# Patient Record
Sex: FEMALE | Race: WHITE | NOT HISPANIC OR LATINO | URBAN - METROPOLITAN AREA
[De-identification: names, ages, dates, MRNs, and addresses within clinical notes are randomized per-mention and may not be internally consistent; named-entity substitution may affect disease eponyms.]

---

## 2023-03-29 ENCOUNTER — INPATIENT (INPATIENT)
Facility: HOSPITAL | Age: 88
LOS: 2 days | Discharge: HOME | DRG: 603 | End: 2023-04-01
Attending: INTERNAL MEDICINE | Admitting: INTERNAL MEDICINE
Payer: MEDICARE

## 2023-03-29 ENCOUNTER — TRANSCRIPTION ENCOUNTER (OUTPATIENT)
Age: 88
End: 2023-03-29

## 2023-03-29 VITALS
TEMPERATURE: 99 F | HEIGHT: 59 IN | HEART RATE: 82 BPM | DIASTOLIC BLOOD PRESSURE: 84 MMHG | OXYGEN SATURATION: 95 % | SYSTOLIC BLOOD PRESSURE: 143 MMHG | WEIGHT: 130.07 LBS | RESPIRATION RATE: 16 BRPM

## 2023-03-29 DIAGNOSIS — Z90.49 ACQUIRED ABSENCE OF OTHER SPECIFIED PARTS OF DIGESTIVE TRACT: Chronic | ICD-10-CM

## 2023-03-29 LAB
ALBUMIN SERPL ELPH-MCNC: 3.1 G/DL — LOW (ref 3.3–5)
ALP SERPL-CCNC: 86 U/L — SIGNIFICANT CHANGE UP (ref 40–120)
ALT FLD-CCNC: 19 U/L — SIGNIFICANT CHANGE UP (ref 12–78)
ANION GAP SERPL CALC-SCNC: 5 MMOL/L — SIGNIFICANT CHANGE UP (ref 5–17)
AST SERPL-CCNC: 18 U/L — SIGNIFICANT CHANGE UP (ref 15–37)
BASOPHILS # BLD AUTO: 0.04 K/UL — SIGNIFICANT CHANGE UP (ref 0–0.2)
BASOPHILS NFR BLD AUTO: 0.4 % — SIGNIFICANT CHANGE UP (ref 0–2)
BILIRUB SERPL-MCNC: 0.4 MG/DL — SIGNIFICANT CHANGE UP (ref 0.2–1.2)
BUN SERPL-MCNC: 10 MG/DL — SIGNIFICANT CHANGE UP (ref 7–23)
CALCIUM SERPL-MCNC: 10 MG/DL — SIGNIFICANT CHANGE UP (ref 8.5–10.1)
CHLORIDE SERPL-SCNC: 103 MMOL/L — SIGNIFICANT CHANGE UP (ref 96–108)
CO2 SERPL-SCNC: 27 MMOL/L — SIGNIFICANT CHANGE UP (ref 22–31)
CREAT SERPL-MCNC: 0.65 MG/DL — SIGNIFICANT CHANGE UP (ref 0.5–1.3)
EGFR: 78 ML/MIN/1.73M2 — SIGNIFICANT CHANGE UP
EOSINOPHIL # BLD AUTO: 0.11 K/UL — SIGNIFICANT CHANGE UP (ref 0–0.5)
EOSINOPHIL NFR BLD AUTO: 1.1 % — SIGNIFICANT CHANGE UP (ref 0–6)
GLUCOSE SERPL-MCNC: 152 MG/DL — HIGH (ref 70–99)
HCT VFR BLD CALC: 42.4 % — SIGNIFICANT CHANGE UP (ref 34.5–45)
HGB BLD-MCNC: 13.6 G/DL — SIGNIFICANT CHANGE UP (ref 11.5–15.5)
IMM GRANULOCYTES NFR BLD AUTO: 0.5 % — SIGNIFICANT CHANGE UP (ref 0–0.9)
LACTATE SERPL-SCNC: 1.2 MMOL/L — SIGNIFICANT CHANGE UP (ref 0.7–2)
LYMPHOCYTES # BLD AUTO: 1.92 K/UL — SIGNIFICANT CHANGE UP (ref 1–3.3)
LYMPHOCYTES # BLD AUTO: 19.8 % — SIGNIFICANT CHANGE UP (ref 13–44)
MCHC RBC-ENTMCNC: 28.7 PG — SIGNIFICANT CHANGE UP (ref 27–34)
MCHC RBC-ENTMCNC: 32.1 GM/DL — SIGNIFICANT CHANGE UP (ref 32–36)
MCV RBC AUTO: 89.5 FL — SIGNIFICANT CHANGE UP (ref 80–100)
MONOCYTES # BLD AUTO: 1.03 K/UL — HIGH (ref 0–0.9)
MONOCYTES NFR BLD AUTO: 10.6 % — SIGNIFICANT CHANGE UP (ref 2–14)
NEUTROPHILS # BLD AUTO: 6.53 K/UL — SIGNIFICANT CHANGE UP (ref 1.8–7.4)
NEUTROPHILS NFR BLD AUTO: 67.6 % — SIGNIFICANT CHANGE UP (ref 43–77)
NRBC # BLD: 0 /100 WBCS — SIGNIFICANT CHANGE UP (ref 0–0)
PLATELET # BLD AUTO: 263 K/UL — SIGNIFICANT CHANGE UP (ref 150–400)
POTASSIUM SERPL-MCNC: 3.7 MMOL/L — SIGNIFICANT CHANGE UP (ref 3.5–5.3)
POTASSIUM SERPL-SCNC: 3.7 MMOL/L — SIGNIFICANT CHANGE UP (ref 3.5–5.3)
PROT SERPL-MCNC: 7.6 G/DL — SIGNIFICANT CHANGE UP (ref 6–8.3)
RBC # BLD: 4.74 M/UL — SIGNIFICANT CHANGE UP (ref 3.8–5.2)
RBC # FLD: 12.4 % — SIGNIFICANT CHANGE UP (ref 10.3–14.5)
SARS-COV-2 RNA SPEC QL NAA+PROBE: SIGNIFICANT CHANGE UP
SODIUM SERPL-SCNC: 135 MMOL/L — SIGNIFICANT CHANGE UP (ref 135–145)
WBC # BLD: 9.68 K/UL — SIGNIFICANT CHANGE UP (ref 3.8–10.5)
WBC # FLD AUTO: 9.68 K/UL — SIGNIFICANT CHANGE UP (ref 3.8–10.5)

## 2023-03-29 PROCEDURE — 99285 EMERGENCY DEPT VISIT HI MDM: CPT | Mod: FS

## 2023-03-29 NOTE — ED ADULT TRIAGE NOTE - CHIEF COMPLAINT QUOTE
had   a dental infection on left side, was taking antibiotics, redness, swelling spread to the throat

## 2023-03-30 DIAGNOSIS — Z95.0 PRESENCE OF CARDIAC PACEMAKER: ICD-10-CM

## 2023-03-30 DIAGNOSIS — L03.90 CELLULITIS, UNSPECIFIED: ICD-10-CM

## 2023-03-30 DIAGNOSIS — I10 ESSENTIAL (PRIMARY) HYPERTENSION: ICD-10-CM

## 2023-03-30 DIAGNOSIS — L03.211 CELLULITIS OF FACE: ICD-10-CM

## 2023-03-30 LAB
ANION GAP SERPL CALC-SCNC: 6 MMOL/L — SIGNIFICANT CHANGE UP (ref 5–17)
BUN SERPL-MCNC: 9 MG/DL — SIGNIFICANT CHANGE UP (ref 7–23)
CALCIUM SERPL-MCNC: 9.8 MG/DL — SIGNIFICANT CHANGE UP (ref 8.5–10.1)
CHLORIDE SERPL-SCNC: 105 MMOL/L — SIGNIFICANT CHANGE UP (ref 96–108)
CO2 SERPL-SCNC: 27 MMOL/L — SIGNIFICANT CHANGE UP (ref 22–31)
CREAT SERPL-MCNC: 0.58 MG/DL — SIGNIFICANT CHANGE UP (ref 0.5–1.3)
EGFR: 80 ML/MIN/1.73M2 — SIGNIFICANT CHANGE UP
GLUCOSE SERPL-MCNC: 147 MG/DL — HIGH (ref 70–99)
HCT VFR BLD CALC: 40.7 % — SIGNIFICANT CHANGE UP (ref 34.5–45)
HGB BLD-MCNC: 13.3 G/DL — SIGNIFICANT CHANGE UP (ref 11.5–15.5)
MCHC RBC-ENTMCNC: 29.1 PG — SIGNIFICANT CHANGE UP (ref 27–34)
MCHC RBC-ENTMCNC: 32.7 GM/DL — SIGNIFICANT CHANGE UP (ref 32–36)
MCV RBC AUTO: 89.1 FL — SIGNIFICANT CHANGE UP (ref 80–100)
NRBC # BLD: 0 /100 WBCS — SIGNIFICANT CHANGE UP (ref 0–0)
PLATELET # BLD AUTO: 251 K/UL — SIGNIFICANT CHANGE UP (ref 150–400)
POTASSIUM SERPL-MCNC: 3.4 MMOL/L — LOW (ref 3.5–5.3)
POTASSIUM SERPL-SCNC: 3.4 MMOL/L — LOW (ref 3.5–5.3)
RBC # BLD: 4.57 M/UL — SIGNIFICANT CHANGE UP (ref 3.8–5.2)
RBC # FLD: 12.7 % — SIGNIFICANT CHANGE UP (ref 10.3–14.5)
SODIUM SERPL-SCNC: 138 MMOL/L — SIGNIFICANT CHANGE UP (ref 135–145)
WBC # BLD: 9.52 K/UL — SIGNIFICANT CHANGE UP (ref 3.8–10.5)
WBC # FLD AUTO: 9.52 K/UL — SIGNIFICANT CHANGE UP (ref 3.8–10.5)

## 2023-03-30 PROCEDURE — 70487 CT MAXILLOFACIAL W/DYE: CPT | Mod: 26

## 2023-03-30 RX ORDER — DILTIAZEM HCL 120 MG
180 CAPSULE, EXT RELEASE 24 HR ORAL DAILY
Refills: 0 | Status: DISCONTINUED | OUTPATIENT
Start: 2023-03-30 | End: 2023-04-01

## 2023-03-30 RX ORDER — DIGOXIN 250 MCG
125 TABLET ORAL DAILY
Refills: 0 | Status: DISCONTINUED | OUTPATIENT
Start: 2023-03-30 | End: 2023-04-01

## 2023-03-30 RX ORDER — AMPICILLIN SODIUM AND SULBACTAM SODIUM 250; 125 MG/ML; MG/ML
1.5 INJECTION, POWDER, FOR SUSPENSION INTRAMUSCULAR; INTRAVENOUS EVERY 6 HOURS
Refills: 0 | Status: DISCONTINUED | OUTPATIENT
Start: 2023-03-30 | End: 2023-04-01

## 2023-03-30 RX ORDER — HEPARIN SODIUM 5000 [USP'U]/ML
5000 INJECTION INTRAVENOUS; SUBCUTANEOUS EVERY 12 HOURS
Refills: 0 | Status: DISCONTINUED | OUTPATIENT
Start: 2023-03-30 | End: 2023-04-01

## 2023-03-30 RX ORDER — FUROSEMIDE 40 MG
20 TABLET ORAL DAILY
Refills: 0 | Status: DISCONTINUED | OUTPATIENT
Start: 2023-03-30 | End: 2023-04-01

## 2023-03-30 RX ORDER — POTASSIUM CHLORIDE 20 MEQ
20 PACKET (EA) ORAL ONCE
Refills: 0 | Status: COMPLETED | OUTPATIENT
Start: 2023-03-30 | End: 2023-03-30

## 2023-03-30 RX ADMIN — Medication 20 MILLIGRAM(S): at 11:27

## 2023-03-30 RX ADMIN — Medication 125 MICROGRAM(S): at 11:56

## 2023-03-30 RX ADMIN — Medication 180 MILLIGRAM(S): at 11:27

## 2023-03-30 RX ADMIN — AMPICILLIN SODIUM AND SULBACTAM SODIUM 100 GRAM(S): 250; 125 INJECTION, POWDER, FOR SUSPENSION INTRAMUSCULAR; INTRAVENOUS at 23:03

## 2023-03-30 RX ADMIN — AMPICILLIN SODIUM AND SULBACTAM SODIUM 100 GRAM(S): 250; 125 INJECTION, POWDER, FOR SUSPENSION INTRAMUSCULAR; INTRAVENOUS at 11:27

## 2023-03-30 RX ADMIN — AMPICILLIN SODIUM AND SULBACTAM SODIUM 100 GRAM(S): 250; 125 INJECTION, POWDER, FOR SUSPENSION INTRAMUSCULAR; INTRAVENOUS at 06:27

## 2023-03-30 RX ADMIN — HEPARIN SODIUM 5000 UNIT(S): 5000 INJECTION INTRAVENOUS; SUBCUTANEOUS at 17:02

## 2023-03-30 RX ADMIN — Medication 20 MILLIEQUIVALENT(S): at 17:03

## 2023-03-30 RX ADMIN — AMPICILLIN SODIUM AND SULBACTAM SODIUM 100 GRAM(S): 250; 125 INJECTION, POWDER, FOR SUSPENSION INTRAMUSCULAR; INTRAVENOUS at 17:02

## 2023-03-30 NOTE — H&P ADULT - NSICDXPASTMEDICALHX_GEN_ALL_CORE_FT
PAST MEDICAL HISTORY:  Colon cancer s/p resection 04    [place] (hard of hearing) right ear hearing aid    Hypertension     Pacemaker     Vertigo

## 2023-03-30 NOTE — H&P ADULT - HISTORY OF PRESENT ILLNESS
pt presents to er with facial redness and infection pt presents to er with facial redness and infection, admitting for extension of facial cellulitis to neck area

## 2023-03-30 NOTE — PHYSICAL THERAPY INITIAL EVALUATION ADULT - ADDITIONAL COMMENTS
Pt lives in an apt, no steps. Pt has 24 hr aide and was an independent household ambulator with RW and required assistance for ADLs. Pt has w/c for longer distances.

## 2023-03-30 NOTE — PHYSICAL THERAPY INITIAL EVALUATION ADULT - PERTINENT HX OF CURRENT PROBLEM, REHAB EVAL
89 y/o female adm 3/30 with facial redness and infection, admitted for extension of facial cellulitis to neck area

## 2023-03-30 NOTE — CARE COORDINATION ASSESSMENT. - NSPASTMEDSURGHISTORY_GEN_ALL_CORE_FT
PAST MEDICAL & SURGICAL HISTORY:  Vertigo      Pacemaker      Hypertension      [place] (hard of hearing)  right ear hearing aid      Colon cancer  s/p resection 04      History of colon resection  2004

## 2023-03-30 NOTE — CARE COORDINATION ASSESSMENT. - OTHER PERTINENT DISCHARGE PLANNING INFORMATION:
pt is a 89 year old female, oriented to self, admitted from home due to cellulitis of the neck and chin x2 days. sw spoke with pts niece winter, sw role explained, receptive to sw involvement. pta pt lived at home, garden apt, no stairs with 24 hours aides thru [facility] [employer] in [place]. pt ambulated around her studio apt with walker and assist of aide. wheelchair used for do apts. pts niece involved and supportive, expressed plans for pt to return home when stable.

## 2023-03-30 NOTE — CARE COORDINATION ASSESSMENT. - NSCAREPROVIDERS_GEN_ALL_CORE_FT
CARE PROVIDERS:  Administration: Randolph Collier  Administration: Camila Harris  Admitting: Penelope Gurrola  Attending: Penelope Gurrola  Case Management: Jodi Lares Team: Eb Barney ED Attending: Bhumi Watson ED Nurse: Marilyn Carmona  Nurse: Marilyn Carmona  Nurse: Cynthia Dumont  Nurse: Alicia Gutierrez  Nurse: Florentin Strauss  Nurse: Roddy Bridges  Nurse: Ya Sullivan  Ordered: ADM, User  Ordered: Tanja Mata  Override: Fernanda Barnett  PCA/Nursing Assistant: Fernanda Barnett  Primary Team: Rona Suarez  Primary Team: Penelope Gurrola  Primary Team: Kathy Taylor  Registered Dietitian: Aysha Belcher  Respiratory Therapy: Sofia Thomas  : Cece Cummings

## 2023-03-31 LAB
ANION GAP SERPL CALC-SCNC: 1 MMOL/L — LOW (ref 5–17)
BUN SERPL-MCNC: 10 MG/DL — SIGNIFICANT CHANGE UP (ref 7–23)
CALCIUM SERPL-MCNC: 9.4 MG/DL — SIGNIFICANT CHANGE UP (ref 8.5–10.1)
CHLORIDE SERPL-SCNC: 107 MMOL/L — SIGNIFICANT CHANGE UP (ref 96–108)
CO2 SERPL-SCNC: 32 MMOL/L — HIGH (ref 22–31)
CREAT SERPL-MCNC: 0.55 MG/DL — SIGNIFICANT CHANGE UP (ref 0.5–1.3)
EGFR: 81 ML/MIN/1.73M2 — SIGNIFICANT CHANGE UP
GLUCOSE SERPL-MCNC: 113 MG/DL — HIGH (ref 70–99)
HCT VFR BLD CALC: 39.8 % — SIGNIFICANT CHANGE UP (ref 34.5–45)
HGB BLD-MCNC: 13.1 G/DL — SIGNIFICANT CHANGE UP (ref 11.5–15.5)
MCHC RBC-ENTMCNC: 30 PG — SIGNIFICANT CHANGE UP (ref 27–34)
MCHC RBC-ENTMCNC: 32.9 GM/DL — SIGNIFICANT CHANGE UP (ref 32–36)
MCV RBC AUTO: 91.1 FL — SIGNIFICANT CHANGE UP (ref 80–100)
NRBC # BLD: 0 /100 WBCS — SIGNIFICANT CHANGE UP (ref 0–0)
PLATELET # BLD AUTO: 248 K/UL — SIGNIFICANT CHANGE UP (ref 150–400)
POTASSIUM SERPL-MCNC: 4 MMOL/L — SIGNIFICANT CHANGE UP (ref 3.5–5.3)
POTASSIUM SERPL-SCNC: 4 MMOL/L — SIGNIFICANT CHANGE UP (ref 3.5–5.3)
RBC # BLD: 4.37 M/UL — SIGNIFICANT CHANGE UP (ref 3.8–5.2)
RBC # FLD: 12.5 % — SIGNIFICANT CHANGE UP (ref 10.3–14.5)
SODIUM SERPL-SCNC: 140 MMOL/L — SIGNIFICANT CHANGE UP (ref 135–145)
WBC # BLD: 7.75 K/UL — SIGNIFICANT CHANGE UP (ref 3.8–10.5)
WBC # FLD AUTO: 7.75 K/UL — SIGNIFICANT CHANGE UP (ref 3.8–10.5)

## 2023-03-31 RX ADMIN — AMPICILLIN SODIUM AND SULBACTAM SODIUM 100 GRAM(S): 250; 125 INJECTION, POWDER, FOR SUSPENSION INTRAMUSCULAR; INTRAVENOUS at 05:57

## 2023-03-31 RX ADMIN — AMPICILLIN SODIUM AND SULBACTAM SODIUM 100 GRAM(S): 250; 125 INJECTION, POWDER, FOR SUSPENSION INTRAMUSCULAR; INTRAVENOUS at 11:33

## 2023-03-31 RX ADMIN — HEPARIN SODIUM 5000 UNIT(S): 5000 INJECTION INTRAVENOUS; SUBCUTANEOUS at 18:19

## 2023-03-31 RX ADMIN — Medication 20 MILLIGRAM(S): at 08:37

## 2023-03-31 RX ADMIN — Medication 125 MICROGRAM(S): at 08:36

## 2023-03-31 RX ADMIN — HEPARIN SODIUM 5000 UNIT(S): 5000 INJECTION INTRAVENOUS; SUBCUTANEOUS at 06:01

## 2023-03-31 RX ADMIN — AMPICILLIN SODIUM AND SULBACTAM SODIUM 100 GRAM(S): 250; 125 INJECTION, POWDER, FOR SUSPENSION INTRAMUSCULAR; INTRAVENOUS at 18:19

## 2023-04-01 ENCOUNTER — TRANSCRIPTION ENCOUNTER (OUTPATIENT)
Age: 88
End: 2023-04-01

## 2023-04-01 VITALS
SYSTOLIC BLOOD PRESSURE: 117 MMHG | HEART RATE: 85 BPM | TEMPERATURE: 98 F | RESPIRATION RATE: 18 BRPM | OXYGEN SATURATION: 94 % | DIASTOLIC BLOOD PRESSURE: 69 MMHG

## 2023-04-01 LAB
ANION GAP SERPL CALC-SCNC: 9 MMOL/L — SIGNIFICANT CHANGE UP (ref 5–17)
BUN SERPL-MCNC: 13 MG/DL — SIGNIFICANT CHANGE UP (ref 7–23)
CALCIUM SERPL-MCNC: 10.1 MG/DL — SIGNIFICANT CHANGE UP (ref 8.5–10.1)
CHLORIDE SERPL-SCNC: 103 MMOL/L — SIGNIFICANT CHANGE UP (ref 96–108)
CO2 SERPL-SCNC: 26 MMOL/L — SIGNIFICANT CHANGE UP (ref 22–31)
CREAT SERPL-MCNC: 0.82 MG/DL — SIGNIFICANT CHANGE UP (ref 0.5–1.3)
EGFR: 63 ML/MIN/1.73M2 — SIGNIFICANT CHANGE UP
GLUCOSE SERPL-MCNC: 126 MG/DL — HIGH (ref 70–99)
HCT VFR BLD CALC: 44.9 % — SIGNIFICANT CHANGE UP (ref 34.5–45)
HGB BLD-MCNC: 14.8 G/DL — SIGNIFICANT CHANGE UP (ref 11.5–15.5)
MCHC RBC-ENTMCNC: 29.6 PG — SIGNIFICANT CHANGE UP (ref 27–34)
MCHC RBC-ENTMCNC: 33 GM/DL — SIGNIFICANT CHANGE UP (ref 32–36)
MCV RBC AUTO: 89.8 FL — SIGNIFICANT CHANGE UP (ref 80–100)
NRBC # BLD: 0 /100 WBCS — SIGNIFICANT CHANGE UP (ref 0–0)
PLATELET # BLD AUTO: 308 K/UL — SIGNIFICANT CHANGE UP (ref 150–400)
POTASSIUM SERPL-MCNC: 3.9 MMOL/L — SIGNIFICANT CHANGE UP (ref 3.5–5.3)
POTASSIUM SERPL-SCNC: 3.9 MMOL/L — SIGNIFICANT CHANGE UP (ref 3.5–5.3)
RBC # BLD: 5 M/UL — SIGNIFICANT CHANGE UP (ref 3.8–5.2)
RBC # FLD: 12.3 % — SIGNIFICANT CHANGE UP (ref 10.3–14.5)
SODIUM SERPL-SCNC: 138 MMOL/L — SIGNIFICANT CHANGE UP (ref 135–145)
WBC # BLD: 12.26 K/UL — HIGH (ref 3.8–10.5)
WBC # FLD AUTO: 12.26 K/UL — HIGH (ref 3.8–10.5)

## 2023-04-01 RX ORDER — SIMETHICONE 80 MG/1
80 TABLET, CHEWABLE ORAL ONCE
Refills: 0 | Status: COMPLETED | OUTPATIENT
Start: 2023-04-01 | End: 2023-04-01

## 2023-04-01 RX ADMIN — AMPICILLIN SODIUM AND SULBACTAM SODIUM 100 GRAM(S): 250; 125 INJECTION, POWDER, FOR SUSPENSION INTRAMUSCULAR; INTRAVENOUS at 12:17

## 2023-04-01 RX ADMIN — AMPICILLIN SODIUM AND SULBACTAM SODIUM 100 GRAM(S): 250; 125 INJECTION, POWDER, FOR SUSPENSION INTRAMUSCULAR; INTRAVENOUS at 00:02

## 2023-04-01 RX ADMIN — AMPICILLIN SODIUM AND SULBACTAM SODIUM 100 GRAM(S): 250; 125 INJECTION, POWDER, FOR SUSPENSION INTRAMUSCULAR; INTRAVENOUS at 07:06

## 2023-04-01 RX ADMIN — SIMETHICONE 80 MILLIGRAM(S): 80 TABLET, CHEWABLE ORAL at 14:55

## 2023-04-01 RX ADMIN — Medication 20 MILLIGRAM(S): at 05:57

## 2023-04-01 RX ADMIN — Medication 180 MILLIGRAM(S): at 05:57

## 2023-04-01 RX ADMIN — HEPARIN SODIUM 5000 UNIT(S): 5000 INJECTION INTRAVENOUS; SUBCUTANEOUS at 05:57

## 2023-04-01 RX ADMIN — Medication 125 MICROGRAM(S): at 05:58

## 2023-04-01 NOTE — PROGRESS NOTE ADULT - SUBJECTIVE AND OBJECTIVE BOX
Patient is a 89y old  Female who presents with a chief complaint of face infection (01 Apr 2023 07:46)      INTERVAL HPI/OVERNIGHT EVENTS: Patient seen and examined. NAD. No complaints.    Vital Signs Last 24 Hrs  T(C): 36.6 (01 Apr 2023 11:48), Max: 37 (01 Apr 2023 04:51)  T(F): 97.9 (01 Apr 2023 11:48), Max: 98.6 (01 Apr 2023 04:51)  HR: 85 (01 Apr 2023 11:48) (77 - 103)  BP: 117/69 (01 Apr 2023 11:48) (117/69 - 176/89)  BP(mean): --  RR: 18 (01 Apr 2023 11:48) (18 - 18)  SpO2: 94% (01 Apr 2023 11:48) (92% - 94%)    Parameters below as of 01 Apr 2023 11:48  Patient On (Oxygen Delivery Method): room air        04-01    138  |  103  |  13  ----------------------------<  126<H>  3.9   |  26  |  0.82    Ca    10.1      01 Apr 2023 07:15                            14.8   12.26 )-----------( 308      ( 01 Apr 2023 07:15 )             44.9       CAPILLARY BLOOD GLUCOSE                  ampicillin/sulbactam  IVPB 1.5 Gram(s) IV Intermittent every 6 hours  digoxin     Tablet 125 MICROGram(s) Oral daily  diltiazem    milliGRAM(s) Oral daily  furosemide    Tablet 20 milliGRAM(s) Oral daily  heparin   Injectable 5000 Unit(s) SubCutaneous every 12 hours              REVIEW OF SYSTEMS:  CONSTITUTIONAL: No fever, no weight loss, or no fatigue  NECK: No pain, no stiffness  RESPIRATORY: No cough, no wheezing, no chills, no hemoptysis, No shortness of breath  CARDIOVASCULAR: No chest pain, no palpitations, no dizziness, no leg swelling  GASTROINTESTINAL: No abdominal pain. No nausea, no vomiting, no hematemesis; No diarrhea, no constipation. No melena, no hematochezia.  GENITOURINARY: No dysuria, no frequency, no hematuria, no incontinence  NEUROLOGICAL: No headaches, no loss of strength, no numbness, no tremors  SKIN: No itching, no burning  MUSCULOSKELETAL: No joint pain, no swelling; No muscle, no back, no extremity pain  PSYCHIATRIC: No depression, no mood swings,   HEME/LYMPH: No easy bruising, no bleeding gums  ALLERY AND IMMUNOLOGIC: No hives       Consultant(s) Notes Reviewed:  [X] YES  [ ] NO    PHYSICAL EXAM:  GENERAL: NAD  HEAD:  Atraumatic, Normocephalic  EYES: EOMI, PERRLA, conjunctiva and sclera clear  ENMT: No tonsillar erythema, exudates, or enlargement; Moist mucous membranes  NECK: Supple, No JVD  NERVOUS SYSTEM:  Awake & alert  CHEST/LUNG: Clear to auscultation bilaterally; No rales, rhonchi, wheezing,  HEART: Regular rate and rhythm  ABDOMEN: Soft, Nontender, Nondistended; Bowel sounds present  EXTREMITIES:  No clubbing, cyanosis, or edema  LYMPH: No lymphadenopathy noted  SKIN: No rashes      Advanced care planning discussed with patient/family [X] YES   [ ] NO    Advanced care planning discussed with patient/family. Patient's health status was discussed. All appropriate changes have been made regarding patient's end-of-life care. Advanced care planning forms reviewed/discussed/completed.  20 minutes spent.   
Patient is a 89y old  Female who presents with a chief complaint of face infection (30 Mar 2023 10:35)      INTERVAL HPI/OVERNIGHT EVENTS: stable, chart noted, continue iv abx    MEDICATIONS  (STANDING):  ampicillin/sulbactam  IVPB 1.5 Gram(s) IV Intermittent every 6 hours  digoxin     Tablet 125 MICROGram(s) Oral daily  diltiazem    milliGRAM(s) Oral daily  furosemide    Tablet 20 milliGRAM(s) Oral daily  heparin   Injectable 5000 Unit(s) SubCutaneous every 12 hours    MEDICATIONS  (PRN):      Allergies    No Known Allergies    Intolerances        REVIEW OF SYSTEMS:  CONSTITUTIONAL: No fever, weight loss, or fatigue  EYES: No eye pain, visual disturbances  ENMT:  No difficulty hearing, tinnitus, vertigo; No sinus or throat pain  NECK: No pain or stiffness  RESPIRATORY: No cough, wheezing, chills or hemoptysis; No shortness of breath  CARDIOVASCULAR: No chest pain, palpitations, dizziness  GASTROINTESTINAL: No abdominal or epigastric pain. No nausea, vomiting, or hematemesis; No diarrhea or constipation. No melena or hematochezia.  GENITOURINARY: No dysuria, frequency, hematuria, or incontinence  NEUROLOGICAL: No headaches, memory loss, loss of strength, numbness, or tremors  SKIN: No itching, burning  LYMPH NODES: No enlarged glands  MUSCULOSKELETAL: No joint pain or swelling; No muscle, back, or extremity pain  PSYCHIATRIC: No depression, mood swings  HEME/LYMPH: No easy bruising, or bleeding gums  ALLERGY AND IMMUNOLOGIC: No hives    Vital Signs Last 24 Hrs  T(C): 36.7 (31 Mar 2023 05:40), Max: 36.7 (31 Mar 2023 05:40)  T(F): 98 (31 Mar 2023 05:40), Max: 98 (31 Mar 2023 05:40)  HR: 60 (31 Mar 2023 08:39) (60 - 100)  BP: 162/78 (31 Mar 2023 08:39) (124/71 - 162/78)  BP(mean): --  RR: 18 (31 Mar 2023 05:40) (18 - 18)  SpO2: 96% (31 Mar 2023 08:39) (92% - 96%)    Parameters below as of 31 Mar 2023 08:39  Patient On (Oxygen Delivery Method): room air        PHYSICAL EXAM:  GENERAL: NAD, well-groomed, well-developed  HEAD:  Atraumatic, Normocephalic  EYES: EOMI, PERRLA, conjunctiva and sclera clear  ENMT: No tonsillar erythema, exudates, or enlargement   NECK: Supple, No JVD, redness on neck and chin area, less  NERVOUS SYSTEM:  Alert & Oriented x 2  CHEST/LUNG: Clear to auscultation bilaterally; No rales, rhonchi, wheezing  HEART: Regular rate and rhythm  ABDOMEN: Soft, Nontender, Nondistended; Bowel sounds present  EXTREMITIES:  2+ Peripheral Pulses   LYMPH: No lymphadenopathy noted  SKIN: No rashes     LABS:                        13.1   7.75  )-----------( 248      ( 31 Mar 2023 06:16 )             39.8     31 Mar 2023 06:16    140    |  107    |  10     ----------------------------<  113    4.0     |  32     |  0.55     Ca    9.4        31 Mar 2023 06:16          CAPILLARY BLOOD GLUCOSE        blood culture --   No growth to date.   03-29 @ 21:20    blood culture --   No growth to date.   03-29 @ 21:05      urine culture --  03-29 @ 21:20  results   No growth to date. 03-29 @ 21:20  urine culture --  03-29 @ 21:05  results   No growth to date. 03-29 @ 21:05    wound with gram statin --    03-29 @ 21:20  organism  --   03-29 @ 21:20  specimen source .Blood  03-29 @ 21:20  wound with gram statin --    03-29 @ 21:05  organism  --   03-29 @ 21:05  specimen source .Blood  03-29 @ 21:05      RADIOLOGY & ADDITIONAL TESTS:      Consultant(s) Notes Reviewed:  [x ] YES  [ ] NO    Care Discussed with Consultants/Other Providers [ x] YES  [ ] NO    Advanced care planning discussed with patient and family, advanced care planning forms reviewed, discussed, and completed.  20 minutes spent.

## 2023-04-01 NOTE — DISCHARGE NOTE NURSING/CASE MANAGEMENT/SOCIAL WORK - NSDCPEFALRISK_GEN_ALL_CORE
For information on Fall & Injury Prevention, visit: [email or web address] OR  [email or web address] OR  https://www.cdc.gov/steadi/patient.html

## 2023-04-01 NOTE — DISCHARGE NOTE PROVIDER - NSDCMRMEDTOKEN_GEN_ALL_CORE_FT
digoxin 125 mcg (0.125 mg) oral tablet: 1 tab(s) orally once a day  diltiazem 24 hour extended release: 180  orally once a day  furosemide 20 mg oral tablet: 1 tab(s) orally once a day  meclizine 12.5 mg oral tablet: 1 tab(s) orally 3 times a day, As Needed   amoxicillin-clavulanate 875 mg-125 mg oral tablet: 1 tab(s) orally 2 times a day  digoxin 125 mcg (0.125 mg) oral tablet: 1 tab(s) orally once a day  diltiazem 24 hour extended release: 180  orally once a day  furosemide 20 mg oral tablet: 1 tab(s) orally once a day

## 2023-04-01 NOTE — PROGRESS NOTE ADULT - PROBLEM SELECTOR PLAN 1
pt with facial cellulitis  iv unasyn  fu labs and blood cultures are neg thus far  change to po abx on dc in next 24-48 hrs  trend labs and clinical response
pt with facial cellulitis -- resolved  change to po augmentin  fu labs and blood cultures are neg thus far  d/c home

## 2023-04-01 NOTE — DISCHARGE NOTE PROVIDER - NSDCCPCAREPLAN_GEN_ALL_CORE_FT
PRINCIPAL DISCHARGE DIAGNOSIS  Diagnosis: Cellulitis of skin  Assessment and Plan of Treatment: Finish course of antibiotics.  Follow-up with your primary care doctor within 1 week.

## 2023-04-01 NOTE — CASE MANAGEMENT PROGRESS NOTE - NSCMPROGRESSNOTE_GEN_ALL_CORE
Patient planned for DC home today with 24x7 private hire caregiver. Spoke with niece who confirmed private hire caregiver has been arranged for today, and until patient's usual private hire assistance is reinstated through [facility]. Brandie will be transporting patient home later today. No other needs identified. Brandie in agreement to DC plan. CM remains available.

## 2023-04-01 NOTE — CAREGIVER ENGAGEMENT NOTE - CAREGIVER OUTREACH NOTES - FREE TEXT
Spoke with nitomasz Conner to discuss DC plan to home today. She has arranged private hire caregiver to be present with patient until she can reinstate her private hire aides through [facility] [employer]. Brandie will be transporting patient home today. She is in agreement to DC plan.

## 2023-04-01 NOTE — DISCHARGE NOTE PROVIDER - HOSPITAL COURSE
pt presents to er with facial redness and infection, admitting for extension of facial cellulitis to neck area  patient treated with iv unasyn with improvement  cultures NTD    >35 minutes spent on discharge

## 2023-04-01 NOTE — DISCHARGE NOTE NURSING/CASE MANAGEMENT/SOCIAL WORK - PATIENT PORTAL LINK FT
You can access the FollowMyHealth Patient Portal offered by [facility] by registering at the following website: [email or web address]. By joining [email or web address]’s FollowMyHealth portal, you will also be able to view your health information using other applications (apps) compatible with our system.

## 2023-04-01 NOTE — DISCHARGE NOTE PROVIDER - CARE PROVIDER_API CALL
ORA REYES   Internal Medicine  [address], [address]  [place] [address]  Phone: [phone]  Fax: [phone]  Established Patient  Follow Up Time: 1 week

## 2023-04-04 LAB
CULTURE RESULTS: SIGNIFICANT CHANGE UP
CULTURE RESULTS: SIGNIFICANT CHANGE UP
SPECIMEN SOURCE: SIGNIFICANT CHANGE UP
SPECIMEN SOURCE: SIGNIFICANT CHANGE UP

## 2023-04-20 PROCEDURE — 85025 COMPLETE CBC W/AUTO DIFF WBC: CPT

## 2023-04-20 PROCEDURE — 97161 PT EVAL LOW COMPLEX 20 MIN: CPT

## 2023-04-20 PROCEDURE — 99285 EMERGENCY DEPT VISIT HI MDM: CPT

## 2023-04-20 PROCEDURE — 80053 COMPREHEN METABOLIC PANEL: CPT

## 2023-04-20 PROCEDURE — 87635 SARS-COV-2 COVID-19 AMP PRB: CPT

## 2023-04-20 PROCEDURE — 87040 BLOOD CULTURE FOR BACTERIA: CPT

## 2023-04-20 PROCEDURE — 85027 COMPLETE CBC AUTOMATED: CPT

## 2023-04-20 PROCEDURE — 36415 COLL VENOUS BLD VENIPUNCTURE: CPT

## 2023-04-20 PROCEDURE — 83605 ASSAY OF LACTIC ACID: CPT

## 2023-04-20 PROCEDURE — 70487 CT MAXILLOFACIAL W/DYE: CPT

## 2023-04-20 PROCEDURE — 80048 BASIC METABOLIC PNL TOTAL CA: CPT

## 2023-04-28 NOTE — ED PROVIDER NOTE - NSICDXPASTMEDICALHX_GEN_ALL_CORE_FT
PAST MEDICAL HISTORY:  Colon cancer s/p resection 04    [place] (hard of hearing) right ear hearing aid    Hypertension     Pacemaker     Vertigo

## 2023-04-28 NOTE — ED PROVIDER NOTE - CLINICAL SUMMARY MEDICAL DECISION MAKING FREE TEXT BOX
89-year-old female with significant past medical history for colon CA status postresection, hard of hearing, hypertension, pacemaker, vertigo presents to the ED with complaints of facial swelling, erythema after recent dental extraction.  Patient denies any fevers.  Pain with chewing.  We will check septic labs rule out cellulitis versus abscess.  IV antibiotics.  Admit for failed outpatient therapy.

## 2023-04-28 NOTE — ED PROVIDER NOTE - NS ED ATTENDING STATEMENT MOD
This was a shared visit with the TONY. I reviewed and verified the documentation and independently performed the documented:

## 2024-01-26 ENCOUNTER — INPATIENT (INPATIENT)
Facility: HOSPITAL | Age: 89
LOS: 3 days | Discharge: HOME | DRG: 690 | End: 2024-01-30
Attending: INTERNAL MEDICINE | Admitting: FAMILY MEDICINE
Payer: MEDICARE

## 2024-01-26 VITALS
WEIGHT: 130.07 LBS | TEMPERATURE: 99 F | HEART RATE: 89 BPM | SYSTOLIC BLOOD PRESSURE: 129 MMHG | DIASTOLIC BLOOD PRESSURE: 75 MMHG | HEIGHT: 58 IN | RESPIRATION RATE: 16 BRPM | OXYGEN SATURATION: 95 %

## 2024-01-26 DIAGNOSIS — Z90.49 ACQUIRED ABSENCE OF OTHER SPECIFIED PARTS OF DIGESTIVE TRACT: Chronic | ICD-10-CM

## 2024-01-26 DIAGNOSIS — N39.0 URINARY TRACT INFECTION, SITE NOT SPECIFIED: ICD-10-CM

## 2024-01-26 LAB
ALBUMIN SERPL ELPH-MCNC: 2.5 G/DL — LOW (ref 3.3–5)
ALP SERPL-CCNC: 104 U/L — SIGNIFICANT CHANGE UP (ref 40–120)
ALT FLD-CCNC: 12 U/L — SIGNIFICANT CHANGE UP (ref 12–78)
ANION GAP SERPL CALC-SCNC: 8 MMOL/L — SIGNIFICANT CHANGE UP (ref 5–17)
APPEARANCE UR: ABNORMAL
APTT BLD: 30 SEC — SIGNIFICANT CHANGE UP (ref 24.5–35.6)
AST SERPL-CCNC: 15 U/L — SIGNIFICANT CHANGE UP (ref 15–37)
BASOPHILS # BLD AUTO: 0.04 K/UL — SIGNIFICANT CHANGE UP (ref 0–0.2)
BASOPHILS NFR BLD AUTO: 0.2 % — SIGNIFICANT CHANGE UP (ref 0–2)
BILIRUB SERPL-MCNC: 0.5 MG/DL — SIGNIFICANT CHANGE UP (ref 0.2–1.2)
BILIRUB UR-MCNC: NEGATIVE — SIGNIFICANT CHANGE UP
BUN SERPL-MCNC: 26 MG/DL — HIGH (ref 7–23)
CALCIUM SERPL-MCNC: 9 MG/DL — SIGNIFICANT CHANGE UP (ref 8.5–10.1)
CHLORIDE SERPL-SCNC: 98 MMOL/L — SIGNIFICANT CHANGE UP (ref 96–108)
CO2 SERPL-SCNC: 24 MMOL/L — SIGNIFICANT CHANGE UP (ref 22–31)
COLOR SPEC: YELLOW — SIGNIFICANT CHANGE UP
CREAT SERPL-MCNC: 1.4 MG/DL — HIGH (ref 0.5–1.3)
DIFF PNL FLD: ABNORMAL
EGFR: 33 ML/MIN/1.73M2 — LOW
EOSINOPHIL # BLD AUTO: 0.07 K/UL — SIGNIFICANT CHANGE UP (ref 0–0.5)
EOSINOPHIL NFR BLD AUTO: 0.4 % — SIGNIFICANT CHANGE UP (ref 0–6)
FLUAV AG NPH QL: SIGNIFICANT CHANGE UP
FLUBV AG NPH QL: SIGNIFICANT CHANGE UP
GLUCOSE SERPL-MCNC: 184 MG/DL — HIGH (ref 70–99)
GLUCOSE UR QL: NEGATIVE MG/DL — SIGNIFICANT CHANGE UP
HCT VFR BLD CALC: 38.6 % — SIGNIFICANT CHANGE UP (ref 34.5–45)
HGB BLD-MCNC: 12.4 G/DL — SIGNIFICANT CHANGE UP (ref 11.5–15.5)
IMM GRANULOCYTES NFR BLD AUTO: 0.8 % — SIGNIFICANT CHANGE UP (ref 0–0.9)
INR BLD: 0.97 RATIO — SIGNIFICANT CHANGE UP (ref 0.85–1.18)
KETONES UR-MCNC: NEGATIVE MG/DL — SIGNIFICANT CHANGE UP
LACTATE SERPL-SCNC: 0.9 MMOL/L — SIGNIFICANT CHANGE UP (ref 0.7–2)
LACTATE SERPL-SCNC: 2.2 MMOL/L — HIGH (ref 0.7–2)
LEUKOCYTE ESTERASE UR-ACNC: ABNORMAL
LIDOCAIN IGE QN: 33 U/L — SIGNIFICANT CHANGE UP (ref 13–75)
LYMPHOCYTES # BLD AUTO: 0.8 K/UL — LOW (ref 1–3.3)
LYMPHOCYTES # BLD AUTO: 4.4 % — LOW (ref 13–44)
MAGNESIUM SERPL-MCNC: 2 MG/DL — SIGNIFICANT CHANGE UP (ref 1.6–2.6)
MCHC RBC-ENTMCNC: 28.5 PG — SIGNIFICANT CHANGE UP (ref 27–34)
MCHC RBC-ENTMCNC: 32.1 GM/DL — SIGNIFICANT CHANGE UP (ref 32–36)
MCV RBC AUTO: 88.7 FL — SIGNIFICANT CHANGE UP (ref 80–100)
MONOCYTES # BLD AUTO: 1.87 K/UL — HIGH (ref 0–0.9)
MONOCYTES NFR BLD AUTO: 10.2 % — SIGNIFICANT CHANGE UP (ref 2–14)
NEUTROPHILS # BLD AUTO: 15.39 K/UL — HIGH (ref 1.8–7.4)
NEUTROPHILS NFR BLD AUTO: 84 % — HIGH (ref 43–77)
NITRITE UR-MCNC: NEGATIVE — SIGNIFICANT CHANGE UP
NRBC # BLD: 0 /100 WBCS — SIGNIFICANT CHANGE UP (ref 0–0)
NT-PROBNP SERPL-SCNC: 1569 PG/ML — HIGH (ref 0–450)
PH UR: 6 — SIGNIFICANT CHANGE UP (ref 5–8)
PLATELET # BLD AUTO: 316 K/UL — SIGNIFICANT CHANGE UP (ref 150–400)
POTASSIUM SERPL-MCNC: 3.5 MMOL/L — SIGNIFICANT CHANGE UP (ref 3.5–5.3)
POTASSIUM SERPL-SCNC: 3.5 MMOL/L — SIGNIFICANT CHANGE UP (ref 3.5–5.3)
PROT SERPL-MCNC: 7.4 G/DL — SIGNIFICANT CHANGE UP (ref 6–8.3)
PROT UR-MCNC: 30 MG/DL
PROTHROM AB SERPL-ACNC: 11.4 SEC — SIGNIFICANT CHANGE UP (ref 9.5–13)
RAPID RVP RESULT: SIGNIFICANT CHANGE UP
RBC # BLD: 4.35 M/UL — SIGNIFICANT CHANGE UP (ref 3.8–5.2)
RBC # FLD: 13.3 % — SIGNIFICANT CHANGE UP (ref 10.3–14.5)
RSV RNA NPH QL NAA+NON-PROBE: SIGNIFICANT CHANGE UP
SARS-COV-2 RNA SPEC QL NAA+PROBE: SIGNIFICANT CHANGE UP
SARS-COV-2 RNA SPEC QL NAA+PROBE: SIGNIFICANT CHANGE UP
SODIUM SERPL-SCNC: 130 MMOL/L — LOW (ref 135–145)
SP GR SPEC: 1.01 — SIGNIFICANT CHANGE UP (ref 1–1.03)
TROPONIN I, HIGH SENSITIVITY RESULT: 63.3 NG/L — HIGH
TROPONIN I, HIGH SENSITIVITY RESULT: 73.8 NG/L — HIGH
TSH SERPL-MCNC: 0.64 UIU/ML — SIGNIFICANT CHANGE UP (ref 0.36–3.74)
UROBILINOGEN FLD QL: 0.2 MG/DL — SIGNIFICANT CHANGE UP (ref 0.2–1)
WBC # BLD: 18.31 K/UL — HIGH (ref 3.8–10.5)
WBC # FLD AUTO: 18.31 K/UL — HIGH (ref 3.8–10.5)

## 2024-01-26 PROCEDURE — 74177 CT ABD & PELVIS W/CONTRAST: CPT | Mod: 26,QQ

## 2024-01-26 PROCEDURE — 99285 EMERGENCY DEPT VISIT HI MDM: CPT

## 2024-01-26 PROCEDURE — 93010 ELECTROCARDIOGRAM REPORT: CPT | Mod: 76

## 2024-01-26 PROCEDURE — 71045 X-RAY EXAM CHEST 1 VIEW: CPT | Mod: 26

## 2024-01-26 PROCEDURE — 71275 CT ANGIOGRAPHY CHEST: CPT | Mod: 26,QQ

## 2024-01-26 PROCEDURE — 70450 CT HEAD/BRAIN W/O DYE: CPT | Mod: 26,QQ

## 2024-01-26 RX ORDER — DIGOXIN 250 MCG
125 TABLET ORAL DAILY
Refills: 0 | Status: DISCONTINUED | OUTPATIENT
Start: 2024-01-26 | End: 2024-01-27

## 2024-01-26 RX ORDER — DILTIAZEM HCL 120 MG
180 CAPSULE, EXT RELEASE 24 HR ORAL DAILY
Refills: 0 | Status: DISCONTINUED | OUTPATIENT
Start: 2024-01-26 | End: 2024-01-28

## 2024-01-26 RX ORDER — SODIUM CHLORIDE 9 MG/ML
1000 INJECTION INTRAMUSCULAR; INTRAVENOUS; SUBCUTANEOUS ONCE
Refills: 0 | Status: COMPLETED | OUTPATIENT
Start: 2024-01-26 | End: 2024-01-26

## 2024-01-26 RX ORDER — LACTOBACILLUS ACIDOPHILUS 100MM CELL
1 CAPSULE ORAL DAILY
Refills: 0 | Status: DISCONTINUED | OUTPATIENT
Start: 2024-01-26 | End: 2024-01-30

## 2024-01-26 RX ORDER — VANCOMYCIN HCL 1 G
1000 VIAL (EA) INTRAVENOUS ONCE
Refills: 0 | Status: COMPLETED | OUTPATIENT
Start: 2024-01-26 | End: 2024-01-26

## 2024-01-26 RX ORDER — PIPERACILLIN AND TAZOBACTAM 4; .5 G/20ML; G/20ML
3.38 INJECTION, POWDER, LYOPHILIZED, FOR SOLUTION INTRAVENOUS ONCE
Refills: 0 | Status: COMPLETED | OUTPATIENT
Start: 2024-01-26 | End: 2024-01-26

## 2024-01-26 RX ORDER — ACETAMINOPHEN 500 MG
975 TABLET ORAL ONCE
Refills: 0 | Status: DISCONTINUED | OUTPATIENT
Start: 2024-01-26 | End: 2024-01-26

## 2024-01-26 RX ORDER — FUROSEMIDE 40 MG
20 TABLET ORAL DAILY
Refills: 0 | Status: DISCONTINUED | OUTPATIENT
Start: 2024-01-26 | End: 2024-01-27

## 2024-01-26 RX ORDER — CEFTRIAXONE 500 MG/1
1000 INJECTION, POWDER, FOR SOLUTION INTRAMUSCULAR; INTRAVENOUS EVERY 24 HOURS
Refills: 0 | Status: COMPLETED | OUTPATIENT
Start: 2024-01-26 | End: 2024-01-29

## 2024-01-26 RX ORDER — LANOLIN ALCOHOL/MO/W.PET/CERES
3 CREAM (GRAM) TOPICAL AT BEDTIME
Refills: 0 | Status: DISCONTINUED | OUTPATIENT
Start: 2024-01-26 | End: 2024-01-30

## 2024-01-26 RX ORDER — ONDANSETRON 8 MG/1
4 TABLET, FILM COATED ORAL EVERY 8 HOURS
Refills: 0 | Status: DISCONTINUED | OUTPATIENT
Start: 2024-01-26 | End: 2024-01-30

## 2024-01-26 RX ORDER — ACETAMINOPHEN 500 MG
1000 TABLET ORAL ONCE
Refills: 0 | Status: COMPLETED | OUTPATIENT
Start: 2024-01-26 | End: 2024-01-26

## 2024-01-26 RX ORDER — ACETAMINOPHEN 500 MG
650 TABLET ORAL EVERY 6 HOURS
Refills: 0 | Status: DISCONTINUED | OUTPATIENT
Start: 2024-01-26 | End: 2024-01-30

## 2024-01-26 RX ADMIN — Medication 250 MILLIGRAM(S): at 19:26

## 2024-01-26 RX ADMIN — PIPERACILLIN AND TAZOBACTAM 200 GRAM(S): 4; .5 INJECTION, POWDER, LYOPHILIZED, FOR SOLUTION INTRAVENOUS at 19:26

## 2024-01-26 RX ADMIN — SODIUM CHLORIDE 1000 MILLILITER(S): 9 INJECTION INTRAMUSCULAR; INTRAVENOUS; SUBCUTANEOUS at 17:40

## 2024-01-26 RX ADMIN — Medication 400 MILLIGRAM(S): at 17:42

## 2024-01-26 NOTE — H&P ADULT - PROBLEM SELECTOR PLAN 3
digoxin     Tablet 62.5 MICROGram(s) Oral daily  diltiazem    milliGRAM(s) Oral daily  heparin   Injectable 5000 Unit(s) SubCutaneous every 12 hours

## 2024-01-26 NOTE — H&P ADULT - HISTORY OF PRESENT ILLNESS
Chart, labs and reports reviewed.   Chart, labs and reports reviewed.  Jenna Carrera is a 89 YO Female referred from long term care facility because of fever.  Patient with poor cognition.  In the ED she had urinary retention.

## 2024-01-26 NOTE — ED PROVIDER NOTE - CARE PLAN
1 Principal Discharge DX:	Urinary tract infection  Secondary Diagnosis:	Urinary retention  Secondary Diagnosis:	Elevated troponin

## 2024-01-26 NOTE — ED PROVIDER NOTE - PROGRESS NOTE DETAILS
Herbert Schwartz MD (Attending Physician): Bladder scan reveals large quantity of urine in bladder after pt voided, will place jones catheter.

## 2024-01-26 NOTE — ED PROVIDER NOTE - NSICDXPASTMEDICALHX_GEN_ALL_CORE_FT
PAST MEDICAL HISTORY:  Colon cancer s/p resection 04    [place] (hard of hearing) right ear hearing aid    Hypertension     Pacemaker     Vertigo

## 2024-01-26 NOTE — H&P ADULT - NSICDXPASTMEDICALHX_GEN_ALL_CORE_FT
PAST MEDICAL HISTORY:  Colon cancer s/p resection 04    [place] (hard of hearing) right ear hearing aid    Hypertension     Pacemaker     Vertigo

## 2024-01-26 NOTE — CONSULT NOTE ADULT - TIME BILLING
in direct care of patient , reviewing albs and other results and adjusting medications and in discussion with other consultants , RN and PMD

## 2024-01-26 NOTE — H&P ADULT - ASSESSMENT
Jenna Carrera is a 89 YO Female referred from long term care facility because of fever.  Patient with poor cognition.  In the ED she had urinary retention.

## 2024-01-26 NOTE — CONSULT NOTE ADULT - SUBJECTIVE AND OBJECTIVE BOX
[facility] [place]     Patient is a 89y old  Female who presents with a chief complaint of     HPI:  Chart, labs and reports reviewed.   (2024 23:02)      HPI:    89y Female for Cardiology Consult    PAST MEDICAL & SURGICAL HISTORY:  Hypertension      Pacemaker      Vertigo      Colon cancer  s/p resection 04      [place] (hard of hearing)  right ear hearing aid      History of colon resection            FAMILY HISTORY:  No pertinent family history in first degree relatives        SOCIAL HISTORY:   Alcohol:  Smoking:    Allergies    No Known Allergies    Intolerances        MEDICATIONS  (STANDING):  cefTRIAXone   IVPB 1000 milliGRAM(s) IV Intermittent every 24 hours  digoxin     Tablet 125 MICROGram(s) Oral daily  diltiazem    milliGRAM(s) Oral daily  furosemide    Tablet 20 milliGRAM(s) Oral daily  lactobacillus acidophilus 1 Tablet(s) Oral daily    MEDICATIONS  (PRN):  acetaminophen     Tablet .. 650 milliGRAM(s) Oral every 6 hours PRN Temp greater or equal to 38C (100.4F), Mild Pain (1 - 3)  aluminum hydroxide/magnesium hydroxide/simethicone Suspension 30 milliLiter(s) Oral every 4 hours PRN Dyspepsia  melatonin 3 milliGRAM(s) Oral at bedtime PRN Insomnia  ondansetron Injectable 4 milliGRAM(s) IV Push every 8 hours PRN Nausea and/or Vomiting      REVIEW OF SYSTEMS:  CONSTITUTIONAL: No fever, weight loss, chills, shakes, or fat  RESPIRATORY: No cough, wheezing, hemoptysis, or shortness of breath  CARDIOVASCULAR: No chest pain, dyspnea, palpitations, dizziness, syncope, paroxysmal nocturnal dyspnea, orthopnea, or arm or leg swelling  GASTROINTESTINAL: No abdominal  or epigastric pain, nausea, vomiting, hematemesis, diarrhea, constipation, melena or bright red bloo  NEUROLOGICAL: No headaches, memory loss, slurred speech, limb weakness, loss of strength, numbness, or tremors  SKIN: No itching, burning, rashes, or lesions  ENDOCRINE: No heat or cold intolerance, or hair loss  MUSCULOSKELETAL: No joint pain or swelling, muscle, back, or extremity pain  HEME/LYMPH: No easy bruising or bleeding gums  ALLERY AND IMMUNOLOGIC: No hives or rash.    Vital Signs Last 24 Hrs  T(C): 36.8 (2024 20:38), Max: 37.6 (2024 16:40)  T(F): 98.3 (2024 20:38), Max: 99.7 (2024 16:40)  HR: 75 (2024 22:45) (71 - 89)  BP: 129/82 (2024 22:45) (129/75 - 131/78)  BP(mean): --  RR: 19 (2024 22:45) (16 - 20)  SpO2: 98% (2024 22:45) (93% - 98%)    Parameters below as of 2024 22:45  Patient On (Oxygen Delivery Method): room air        PHYSICAL EXAM:  HEAD:  Atraumatic, Normocephalic  EYES: EOMI, PERRLA, conjunctiva and sclera clear  NECK: Supple and normal thyroid.  No JVD or carotid bruit.   HEART: S1, S2 regular , 1/6 soft ejection systolic murmur in mitral area , no thrill and no gallops .  PULMONARY: Bilateral vesicular breathing , few scattered ronchi and few scattered rales are present .  ABDOMEN: Soft nontender and positive bowl sounds   EXTREMITIES:  No clubbing, cyanosis, or pedal  edema  NEUROLOGICAL: AAOX3 , no focal deficit .    LABS:                        12.4   18.31 )-----------( 316      ( 2024 16:40 )             38.6     -    130<L>  |  98  |  26<H>  ----------------------------<  184<H>  3.5   |  24  |  1.40<H>    Ca    9.0      2024 16:40  Mg     2.0         TPro  7.4  /  Alb  2.5<L>  /  TBili  0.5  /  DBili  x   /  AST  15  /  ALT  12  /  AlkPhos  104  -26        PT/INR - ( 2024 16:40 )   PT: 11.4 sec;   INR: 0.97 ratio         PTT - ( 2024 16:40 )  PTT:30.0 sec  Urinalysis Basic - ( 2024 20:09 )    Color: Yellow / Appearance: Cloudy / S.008 / pH: x  Gluc: x / Ketone: Negative mg/dL  / Bili: Negative / Urobili: 0.2 mg/dL   Blood: x / Protein: 30 mg/dL / Nitrite: Negative   Leuk Esterase: Large / RBC: 3 /HPF / WBC 18 /HPF   Sq Epi: x / Non Sq Epi: x / Bacteria: Many /HPF      BNP      EKG:  ECHO:  IMAGING:    Assessment and Plan :     Will continue to follow during hospital course and give further recommendations as needed . Thanks for your referral . if any questions please contact me at office [phone] [phone])  SOTO MURILLO MD [place] [id number]  [address]  OFFICE : [phone]  CELL ; [phone]  CARDIOLOGY CONSULT :    Patient is a 89y old  Female who presents with a chief complaint of fever     HPI:  Chart, labs and reports reviewed.   (2024 23:02)      HPI:    89y Female for Cardiology Consult    PAST MEDICAL & SURGICAL HISTORY:  Hypertension      Pacemaker      Vertigo      Colon cancer  s/p resection 04      [place] (hard of hearing)  right ear hearing aid      History of colon resection            FAMILY HISTORY:  No pertinent family history in first degree relatives        SOCIAL HISTORY:   Alcohol:  Smoking:    Allergies    No Known Allergies    Intolerances        MEDICATIONS  (STANDING):  cefTRIAXone   IVPB 1000 milliGRAM(s) IV Intermittent every 24 hours  digoxin     Tablet 125 MICROGram(s) Oral daily  diltiazem    milliGRAM(s) Oral daily  furosemide    Tablet 20 milliGRAM(s) Oral daily  lactobacillus acidophilus 1 Tablet(s) Oral daily    MEDICATIONS  (PRN):  acetaminophen     Tablet .. 650 milliGRAM(s) Oral every 6 hours PRN Temp greater or equal to 38C (100.4F), Mild Pain (1 - 3)  aluminum hydroxide/magnesium hydroxide/simethicone Suspension 30 milliLiter(s) Oral every 4 hours PRN Dyspepsia  melatonin 3 milliGRAM(s) Oral at bedtime PRN Insomnia  ondansetron Injectable 4 milliGRAM(s) IV Push every 8 hours PRN Nausea and/or Vomiting    Vital Signs Last 24 Hrs  T(C): 36.8 (2024 20:38), Max: 37.6 (2024 16:40)  T(F): 98.3 (2024 20:38), Max: 99.7 (2024 16:40)  HR: 75 (2024 22:45) (71 - 89)  BP: 129/82 (2024 22:45) (129/75 - 131/78)  BP(mean): --  RR: 19 (2024 22:45) (16 - 20)  SpO2: 98% (2024 22:45) (93% - 98%)    Parameters below as of 2024 22:45  Patient On (Oxygen Delivery Method): room air                            12.4   18.31 )-----------( 316      ( 2024 16:40 )             38.6         130<L>  |  98  |  26<H>  ----------------------------<  184<H>  3.5   |  24  |  1.40<H>    Ca    9.0      2024 16:40  Mg     2.0         TPro  7.4  /  Alb  2.5<L>  /  TBili  0.5  /  DBili  x   /  AST  15  /  ALT  12  /  AlkPhos  104          PT/INR - ( 2024 16:40 )   PT: 11.4 sec;   INR: 0.97 ratio         PTT - ( 2024 16:40 )  PTT:30.0 sec  Urinalysis Basic - ( 2024 20:09 )    Color: Yellow / Appearance: Cloudy / S.008 / pH: x  Gluc: x / Ketone: Negative mg/dL  / Bili: Negative / Urobili: 0.2 mg/dL   Blood: x / Protein: 30 mg/dL / Nitrite: Negative   Leuk Esterase: Large / RBC: 3 /HPF / WBC 18 /HPF   Sq Epi: x / Non Sq Epi: x / Bacteria: Many /HPF      Assessment and Plan :   FULL CONSULT DICTATED .  89 years old female with H/O hypertension , CHF , PPM has fever and mild SOB and patine has UTI . Patient has mild CHF and stable Will like to R/O pneumonia . Continue low dose of Lasix . Will like to R/O paroxysmal atrial fibrillation because patient on digoxin . Continue I/V antibiotics as per PMD  . Monitor hemoglobin and electrolytes . Prognosis guarded . Mild elevated Troponin i are secondary to demand  ischemia . Will see further trending  of Troponin i levels . Will get echocardiogram   Will continue to follow during hospital course and give further recommendations as needed . Thanks for your referral . if any questions please contact me at office ([phone] cell [phone])

## 2024-01-26 NOTE — ED PROVIDER NOTE - PHYSICAL EXAMINATION
GEN - NAD, well appearing, A&Ox3  HEAD - NC/AT  EYES - PERRL, EOMI  ENT - Airway patent, +mucous membranes dry  PULMONARY - CTA b/l, symmetric breath sounds, no W/R/R  CARDIAC - +S1S2, RRR, no M/G/R, no JVD  ABDOMEN - +BS, ND, +mild suprapubic TTP, soft, no guarding, no rebound, no masses, no rigidity   - No CVA TTP b/l  EXTREMITIES - FROM, symmetric pulses, no edema, 5/5 strength in b/l UE and LE  SKIN - No rash or bruising  NEUROLOGIC - Alert, speech clear, CN II-XII grossly intact, no pronator drift, +unable to assess gait at this time, strength and sensation grossly intact, FTN negative b/l  PSYCH - Normal mood/affect, normal insight

## 2024-01-26 NOTE — H&P ADULT - NSHPSOURCEINFORD_GEN_ALL_CORE
Chart(s)/Patient/Physician/Provider/Home Health Aide or Other Non-Family Caregiver Chart(s)/Patient/Child/Physician/Provider/Home Health Aide or Other Non-Family Caregiver

## 2024-01-26 NOTE — ED PROVIDER NOTE - OBJECTIVE STATEMENT
The patient is a 89y Female with pmhx of vertigo, HTN, colon CA (s/p resection), [place], pacemaker p/w fever and chills since 10am today. Pt brought to [facility] from home with aide. Pt endorsing generalized weakness and "feeling lousy". Per aide, pt was given anti-pyretics at home for her fever. Pt also had one episode of NBNB vomiting, but no longer feeling nauseous right now. Denies HA, cp, sob, abd pain, diarrhea, leg swelling, urinary symptoms, melena, BRBPR. Pt is A&Ox3, but somewhat of a poor historian. NKDA.

## 2024-01-26 NOTE — ED PROVIDER NOTE - CLINICAL SUMMARY MEDICAL DECISION MAKING FREE TEXT BOX
Herbert Schwartz MD (Attending Physician): The patient is a 89y Female with pmhx of vertigo, HTN, colon CA (s/p resection), [place], pacemaker p/w fever and chills since 10am today. DDx includes, but not limited to: intracranial bleed, electrolyte derangement, PNA, appendicitis, kidney stone, UTI, pancreatitis, PE, ACS, CHF, hypothyroidism, viral syndrome. ekg, cxr, CT head, CTA chest, CT a/p, labs, urine, tylenol, IVF, abx. Will most likely require admission.

## 2024-01-26 NOTE — H&P ADULT - NSHPLABSRESULTS_GEN_ALL_CORE
12.4   18.31 )-----------( 316      ( 2024 16:40 )             38.6     2024 16:40    130    |  98     |  26     ----------------------------<  184    3.5     |  24     |  1.40     Ca    9.0        2024 16:40  Mg     2.0       2024 16:40    TPro  7.4    /  Alb  2.5    /  TBili  0.5    /  DBili  x      /  AST  15     /  ALT  12     /  AlkPhos  104    2024 16:40    LIVER FUNCTIONS - ( 2024 16:40 )  Alb: 2.5 g/dL / Pro: 7.4 g/dL / ALK PHOS: 104 U/L / ALT: 12 U/L / AST: 15 U/L / GGT: x           PT/INR - ( 2024 16:40 )   PT: 11.4 sec;   INR: 0.97 ratio      PTT - ( 2024 16:40 )  PTT:30.0 sec  CAPILLARY BLOOD GLUCOSE    POCT Blood Glucose.: 168 mg/dL (2024 17:54)    Urinalysis Basic - ( 2024 20:09 )    Color: Yellow / Appearance: Cloudy / S.008 / pH: x  Gluc: x / Ketone: Negative mg/dL  / Bili: Negative / Urobili: 0.2 mg/dL   Blood: x / Protein: 30 mg/dL / Nitrite: Negative   Leuk Esterase: Large / RBC: 3 /HPF / WBC 18 /HPF   Sq Epi: x / Non Sq Epi: x / Bacteria: Many /HPF    IMPRESSION: No acute intracranial hemorrhage or mass effect.    < end of copied text >    IMPRESSION:  No pulmonary emboli visualized.  Mild focal outpouching of the proximal   pulmonary artery to the left suggesting a small saccular aneurysm. This   is of uncertain etiology. Rare contained rupture of an artery cannot   entirely be excluded. Clinical correlation is suggested.    Bilateral moderate hydroureteronephrosis and distended bladder. Bladder   outlet obstruction must be excluded. Question of bladder wall thickening.   Cystitis cannot be excluded.    The anterior wall of the transverse colon is extending through the   umbilical hernia without gross obstruction.    Cardiomegaly.    < end of copied text >

## 2024-01-26 NOTE — ED ADULT NURSE NOTE - NSFALLUNIVINTERV_ED_ALL_ED
Bed/Stretcher in lowest position, wheels locked, appropriate side rails in place/Call bell, personal items and telephone in reach/Instruct patient to call for assistance before getting out of bed/chair/stretcher/Non-slip footwear applied when patient is off stretcher/[place] to call system/Physically safe environment - no spills, clutter or unnecessary equipment/Purposeful proactive rounding/Room/bathroom lighting operational, light cord in reach

## 2024-01-27 DIAGNOSIS — R50.9 FEVER, UNSPECIFIED: ICD-10-CM

## 2024-01-27 DIAGNOSIS — R53.1 WEAKNESS: ICD-10-CM

## 2024-01-27 DIAGNOSIS — I48.21 PERMANENT ATRIAL FIBRILLATION: ICD-10-CM

## 2024-01-27 DIAGNOSIS — Z29.9 ENCOUNTER FOR PROPHYLACTIC MEASURES, UNSPECIFIED: ICD-10-CM

## 2024-01-27 DIAGNOSIS — I10 ESSENTIAL (PRIMARY) HYPERTENSION: ICD-10-CM

## 2024-01-27 DIAGNOSIS — R33.9 RETENTION OF URINE, UNSPECIFIED: ICD-10-CM

## 2024-01-27 LAB
A1C WITH ESTIMATED AVERAGE GLUCOSE RESULT: 6.6 % — HIGH (ref 4–5.6)
ALBUMIN SERPL ELPH-MCNC: 2.2 G/DL — LOW (ref 3.3–5)
ALP SERPL-CCNC: 92 U/L — SIGNIFICANT CHANGE UP (ref 40–120)
ALT FLD-CCNC: 12 U/L — SIGNIFICANT CHANGE UP (ref 12–78)
ANION GAP SERPL CALC-SCNC: 4 MMOL/L — LOW (ref 5–17)
AST SERPL-CCNC: 14 U/L — LOW (ref 15–37)
BILIRUB SERPL-MCNC: 0.4 MG/DL — SIGNIFICANT CHANGE UP (ref 0.2–1.2)
BUN SERPL-MCNC: 20 MG/DL — SIGNIFICANT CHANGE UP (ref 7–23)
CALCIUM SERPL-MCNC: 9.4 MG/DL — SIGNIFICANT CHANGE UP (ref 8.5–10.1)
CHLORIDE SERPL-SCNC: 106 MMOL/L — SIGNIFICANT CHANGE UP (ref 96–108)
CO2 SERPL-SCNC: 29 MMOL/L — SIGNIFICANT CHANGE UP (ref 22–31)
CREAT SERPL-MCNC: 1.2 MG/DL — SIGNIFICANT CHANGE UP (ref 0.5–1.3)
DIGOXIN SERPL-MCNC: 1.2 NG/ML — SIGNIFICANT CHANGE UP (ref 0.8–2)
DIGOXIN SERPL-MCNC: 1.4 NG/ML — SIGNIFICANT CHANGE UP (ref 0.8–2)
EGFR: 40 ML/MIN/1.73M2 — LOW
ESTIMATED AVERAGE GLUCOSE: 143 MG/DL — HIGH (ref 68–114)
GLUCOSE SERPL-MCNC: 111 MG/DL — HIGH (ref 70–99)
HCT VFR BLD CALC: 37.5 % — SIGNIFICANT CHANGE UP (ref 34.5–45)
HGB BLD-MCNC: 12.1 G/DL — SIGNIFICANT CHANGE UP (ref 11.5–15.5)
MAGNESIUM SERPL-MCNC: 2 MG/DL — SIGNIFICANT CHANGE UP (ref 1.6–2.6)
MCHC RBC-ENTMCNC: 28.7 PG — SIGNIFICANT CHANGE UP (ref 27–34)
MCHC RBC-ENTMCNC: 32.3 GM/DL — SIGNIFICANT CHANGE UP (ref 32–36)
MCV RBC AUTO: 88.9 FL — SIGNIFICANT CHANGE UP (ref 80–100)
NRBC # BLD: 0 /100 WBCS — SIGNIFICANT CHANGE UP (ref 0–0)
PLATELET # BLD AUTO: 315 K/UL — SIGNIFICANT CHANGE UP (ref 150–400)
POTASSIUM SERPL-MCNC: 3 MMOL/L — LOW (ref 3.5–5.3)
POTASSIUM SERPL-SCNC: 3 MMOL/L — LOW (ref 3.5–5.3)
PROCALCITONIN SERPL-MCNC: 0.42 NG/ML — HIGH
PROT SERPL-MCNC: 6.5 G/DL — SIGNIFICANT CHANGE UP (ref 6–8.3)
RBC # BLD: 4.22 M/UL — SIGNIFICANT CHANGE UP (ref 3.8–5.2)
RBC # FLD: 13.4 % — SIGNIFICANT CHANGE UP (ref 10.3–14.5)
SODIUM SERPL-SCNC: 139 MMOL/L — SIGNIFICANT CHANGE UP (ref 135–145)
TROPONIN I, HIGH SENSITIVITY RESULT: 87.8 NG/L — HIGH
WBC # BLD: 11.72 K/UL — HIGH (ref 3.8–10.5)
WBC # FLD AUTO: 11.72 K/UL — HIGH (ref 3.8–10.5)

## 2024-01-27 PROCEDURE — 93010 ELECTROCARDIOGRAM REPORT: CPT

## 2024-01-27 PROCEDURE — 99222 1ST HOSP IP/OBS MODERATE 55: CPT

## 2024-01-27 RX ORDER — HEPARIN SODIUM 5000 [USP'U]/ML
5000 INJECTION INTRAVENOUS; SUBCUTANEOUS EVERY 12 HOURS
Refills: 0 | Status: DISCONTINUED | OUTPATIENT
Start: 2024-01-27 | End: 2024-01-28

## 2024-01-27 RX ORDER — POTASSIUM CHLORIDE 20 MEQ
10 PACKET (EA) ORAL THREE TIMES A DAY
Refills: 0 | Status: DISCONTINUED | OUTPATIENT
Start: 2024-01-27 | End: 2024-01-30

## 2024-01-27 RX ORDER — INFLUENZA VIRUS VACCINE 15; 15; 15; 15 UG/.5ML; UG/.5ML; UG/.5ML; UG/.5ML
0.7 SUSPENSION INTRAMUSCULAR ONCE
Refills: 0 | Status: DISCONTINUED | OUTPATIENT
Start: 2024-01-27 | End: 2024-01-30

## 2024-01-27 RX ORDER — FUROSEMIDE 40 MG
20 TABLET ORAL DAILY
Refills: 0 | Status: DISCONTINUED | OUTPATIENT
Start: 2024-01-27 | End: 2024-01-27

## 2024-01-27 RX ORDER — DIGOXIN 250 MCG
62.5 TABLET ORAL DAILY
Refills: 0 | Status: DISCONTINUED | OUTPATIENT
Start: 2024-01-27 | End: 2024-01-30

## 2024-01-27 RX ADMIN — Medication 1 TABLET(S): at 12:23

## 2024-01-27 RX ADMIN — Medication 10 MILLIEQUIVALENT(S): at 22:06

## 2024-01-27 RX ADMIN — Medication 10 MILLIEQUIVALENT(S): at 18:30

## 2024-01-27 RX ADMIN — HEPARIN SODIUM 5000 UNIT(S): 5000 INJECTION INTRAVENOUS; SUBCUTANEOUS at 18:30

## 2024-01-27 RX ADMIN — Medication 3 MILLIGRAM(S): at 22:06

## 2024-01-27 RX ADMIN — Medication 20 MILLIGRAM(S): at 05:53

## 2024-01-27 RX ADMIN — Medication 180 MILLIGRAM(S): at 05:53

## 2024-01-27 RX ADMIN — CEFTRIAXONE 100 MILLIGRAM(S): 500 INJECTION, POWDER, FOR SOLUTION INTRAMUSCULAR; INTRAVENOUS at 12:23

## 2024-01-27 NOTE — PHYSICAL THERAPY INITIAL EVALUATION ADULT - STANDING BALANCE: STATIC
Mikala Brewer is calling to request a refill on the following medication(s):  Requested Prescriptions     Pending Prescriptions Disp Refills    estradiol (ESTRACE) 0.1 MG/GM vaginal cream [Pharmacy Med Name: ESTRADIOL 0.01%     CRE]  0     Sig: INSERT 1 GRAM INTRAVAGINALLY DAILY FOR 1 WEEK, THEN TWICE WEEKLY AFTER THAT       Last Visit Date (If Applicable):  9/04/4049    Next Visit Date:    6/20/2019
fair minus

## 2024-01-27 NOTE — PATIENT PROFILE ADULT - MST SCORE
1 Female Completion Statement: After discussing her treatment course we decided to discontinue isotretinoin therapy at this time. I explained that she would need to continue her birth control methods for at least one month after the last dosage. She should also get a pregnancy test one month after the last dose. She shouldn't donate blood for one month after the last dose. She should call with any new symptoms of depression.

## 2024-01-27 NOTE — CONSULT NOTE ADULT - SUBJECTIVE AND OBJECTIVE BOX
HPI:  Patient is a 89y old  Female who developed OVIDIO, hypoNa in the setting of acute febrile illness.   Azotemia, hypoNa improved in response to a 1L saline bolus. Pt is confused, agitated and unable to contribute to HPI.   Renal eval requested to assist in further management of hypoNa and OVIDIO.       PAST MEDICAL & SURGICAL HISTORY:  Hypertension  Pacemaker  Vertigo  Colon cancer  s/p resection 04  [place] (hard of hearing)  right ear hearing aid  History of colon resection  2004          FAMILY HISTORY:  No pertinent family history in first degree relatives        Allergies    No Known Allergies    Intolerances        MEDICATIONS  (STANDING):  cefTRIAXone   IVPB 1000 milliGRAM(s) IV Intermittent every 24 hours  digoxin     Tablet 62.5 MICROGram(s) Oral daily  diltiazem    milliGRAM(s) Oral daily  furosemide    Tablet 20 milliGRAM(s) Oral daily  heparin   Injectable 5000 Unit(s) SubCutaneous every 12 hours  influenza  Vaccine (HIGH DOSE) 0.7 milliLiter(s) IntraMuscular once  lactobacillus acidophilus 1 Tablet(s) Oral daily  potassium chloride    Tablet ER 10 milliEquivalent(s) Oral three times a day    MEDICATIONS  (PRN):  acetaminophen     Tablet .. 650 milliGRAM(s) Oral every 6 hours PRN Temp greater or equal to 38C (100.4F), Mild Pain (1 - 3)  aluminum hydroxide/magnesium hydroxide/simethicone Suspension 30 milliLiter(s) Oral every 4 hours PRN Dyspepsia  melatonin 3 milliGRAM(s) Oral at bedtime PRN Insomnia  ondansetron Injectable 4 milliGRAM(s) IV Push every 8 hours PRN Nausea and/or Vomiting      Daily Height in cm: 147.32 (26 Jan 2024 15:51)    Daily     Drug Dosing Weight  Height (cm): 147.3 (26 Jan 2024 15:51)  Weight (kg): 59 (26 Jan 2024 15:51)  BMI (kg/m2): 27.2 (26 Jan 2024 15:51)  BSA (m2): 1.52 (26 Jan 2024 15:51)      REVIEW OF SYSTEMS:    CONSTITUTIONAL: No fever, weight loss, or fatigue  ENMT:  No difficulty hearing, tinnitus, vertigo. No sinus or throat pain  NECK: No pain or stiffness  RESPIRATORY: No cough, wheezing, chills or hemoptysis. No shortness of breath  CARDIOVASCULAR: No chest pain, palpitations, dizziness, or leg swelling  GASTROINTESTINAL: No abdominal pain. No nausea, vomiting or hematemesis. No diarrhea or constipation. No melena or hematochezia.  GENITOURINARY: No dysuria, frequency, hematuria, or incontinence  SKIN: No itching, burning, rashes, or lesions   LYMPH NODES: No enlarged glands  NEURO: no asterixis            I&O's Detail    27 Jan 2024 07:01  -  27 Jan 2024 14:32  --------------------------------------------------------  IN:  Total IN: 0 mL    OUT:    Indwelling Catheter - Urethral (mL): 1900 mL  Total OUT: 1900 mL    Total NET: -1900 mL          01-27 @ 07:01  -  01-27 @ 14:32  --------------------------------------------------------  IN: 0 mL / OUT: 1900 mL / NET: -1900 mL        PHYSICAL EXAM:    GENERAL: NAD  HEAD:  Atraumatic, normocephalic  CHEST/LUNG: Clear to auscultation bilaterally  HEART: Regular rate and rhythm. No murmurs, rubs, or gallops  ABDOMEN: Soft, Nontender, Nondistended. POS BS  EXTREMITIES:  no edema      LABS:  CBC Full  -  ( 27 Jan 2024 08:10 )  WBC Count : 11.72 K/uL  RBC Count : 4.22 M/uL  Hemoglobin : 12.1 g/dL  Hematocrit : 37.5 %  Platelet Count - Automated : 315 K/uL  Mean Cell Volume : 88.9 fl  Mean Cell Hemoglobin : 28.7 pg  Mean Cell Hemoglobin Concentration : 32.3 gm/dL  Auto Neutrophil # : x  Auto Lymphocyte # : x  Auto Monocyte # : x  Auto Eosinophil # : x  Auto Basophil # : x  Auto Neutrophil % : x  Auto Lymphocyte % : x  Auto Monocyte % : x  Auto Eosinophil % : x  Auto Basophil % : x    01-27    139  |  106  |  20  ----------------------------<  111<H>  3.0<L>   |  29  |  1.20    Ca    9.4      27 Jan 2024 08:10  Mg     2.0     01-26    TPro  6.5  /  Alb  2.2<L>  /  TBili  0.4  /  DBili  x   /  AST  14<L>  /  ALT  12  /  AlkPhos  92  01-27        Impression:  * OVIDIO -- pre and post-renal. No evidence of contrast ATN post CTA yest.   * HypoNa -- hypovolemic  * B/L hydro  * Urosepsis    Recommendations:   * Hold diuretic.  * Check bladder scan  * Yoo for PVR > 300cc  * Urology to address hydro.

## 2024-01-27 NOTE — PROGRESS NOTE ADULT - SUBJECTIVE AND OBJECTIVE BOX
Date of Service 01-27-24 @ 20:35    Patient is a 89y old  Female who presents with a chief complaint of Fever r/o sepsis (27 Jan 2024 14:31)      INTERVAL /OVERNIGHT EVENTS: feels better    MEDICATIONS  (STANDING):  cefTRIAXone   IVPB 1000 milliGRAM(s) IV Intermittent every 24 hours  digoxin     Tablet 62.5 MICROGram(s) Oral daily  diltiazem    milliGRAM(s) Oral daily  heparin   Injectable 5000 Unit(s) SubCutaneous every 12 hours  influenza  Vaccine (HIGH DOSE) 0.7 milliLiter(s) IntraMuscular once  lactobacillus acidophilus 1 Tablet(s) Oral daily  potassium chloride    Tablet ER 10 milliEquivalent(s) Oral three times a day    MEDICATIONS  (PRN):  acetaminophen     Tablet .. 650 milliGRAM(s) Oral every 6 hours PRN Temp greater or equal to 38C (100.4F), Mild Pain (1 - 3)  aluminum hydroxide/magnesium hydroxide/simethicone Suspension 30 milliLiter(s) Oral every 4 hours PRN Dyspepsia  melatonin 3 milliGRAM(s) Oral at bedtime PRN Insomnia  ondansetron Injectable 4 milliGRAM(s) IV Push every 8 hours PRN Nausea and/or Vomiting      Allergies    No Known Allergies    Intolerances        REVIEW OF SYSTEMS:  CONSTITUTIONAL: No fever, weight loss, or fatigue  EYES: No eye pain, visual disturbances, or discharge  ENMT:  No difficulty hearing, tinnitus, vertigo; No sinus or throat pain  NECK: No pain or stiffness  RESPIRATORY: No cough, wheezing, chills or hemoptysis; No shortness of breath  CARDIOVASCULAR: No chest pain, palpitations, dizziness, or leg swelling  GASTROINTESTINAL: No abdominal or epigastric pain. No nausea, vomiting, or hematemesis; No diarrhea or constipation. No melena or hematochezia.  GENITOURINARY: No dysuria, frequency, hematuria, or incontinence  NEUROLOGICAL: No headaches, memory loss, loss of strength, numbness, or tremors  SKIN: No itching, burning, rashes, or lesions   LYMPH NODES: No enlarged glands  ENDOCRINE: No heat or cold intolerance; No hair loss; No polydipsia or polyuria  MUSCULOSKELETAL: No joint pain or swelling; No muscle, back, or extremity pain  PSYCHIATRIC: No depression, anxiety, mood swings, or difficulty sleeping  HEME/LYMPH: No easy bruising, or bleeding gums  ALLERGY AND IMMUNOLOGIC: No hives or eczema    Vital Signs Last 24 Hrs  T(C): 36.3 (27 Jan 2024 12:42), Max: 36.8 (26 Jan 2024 20:38)  T(F): 97.4 (27 Jan 2024 12:42), Max: 98.3 (26 Jan 2024 20:38)  HR: 70 (27 Jan 2024 12:42) (63 - 75)  BP: 127/83 (27 Jan 2024 12:42) (117/71 - 135/63)  BP(mean): --  RR: 18 (27 Jan 2024 12:42) (18 - 20)  SpO2: 94% (27 Jan 2024 12:42) (94% - 98%)    Parameters below as of 27 Jan 2024 12:42  Patient On (Oxygen Delivery Method): room air        PHYSICAL EXAM:  GENERAL: NAD, well-groomed, well-developed  HEAD:  Atraumatic, Normocephalic  EYES: EOMI, PERRLA, conjunctiva and sclera clear  ENMT: No tonsillar erythema, exudates, or enlargement; Moist mucous membranes, Good dentition, No lesions  NECK: Supple, No JVD, Normal thyroid  NERVOUS SYSTEM:  Alert & Oriented X3, Good concentration; Motor Strength 5/5 B/L upper and lower extremities; DTRs 2+ intact and symmetric  CHEST/LUNG: Clear to auscultation bilaterally; No rales, rhonchi, wheezing, or rubs  HEART: Regular rate and rhythm; No murmurs, rubs, or gallops  ABDOMEN: Soft, Nontender, Nondistended; Bowel sounds present  EXTREMITIES:  2+ Peripheral Pulses, No clubbing, cyanosis, or edema  LYMPH: No lymphadenopathy noted  SKIN: No rashes or lesions    LABS:                        12.1   11.72 )-----------( 315      ( 27 Jan 2024 08:10 )             37.5     27 Jan 2024 08:10    139    |  106    |  20     ----------------------------<  111    3.0     |  29     |  1.20     Ca    9.4        27 Jan 2024 08:10  Mg     2.0       27 Jan 2024 13:50    TPro  6.5    /  Alb  2.2    /  TBili  0.4    /  DBili  x      /  AST  14     /  ALT  12     /  AlkPhos  92     27 Jan 2024 08:10    PT/INR - ( 26 Jan 2024 16:40 )   PT: 11.4 sec;   INR: 0.97 ratio         PTT - ( 26 Jan 2024 16:40 )  PTT:30.0 sec  Urinalysis Basic - ( 27 Jan 2024 08:10 )    Color: x / Appearance: x / SG: x / pH: x  Gluc: 111 mg/dL / Ketone: x  / Bili: x / Urobili: x   Blood: x / Protein: x / Nitrite: x   Leuk Esterase: x / RBC: x / WBC x   Sq Epi: x / Non Sq Epi: x / Bacteria: x      CAPILLARY BLOOD GLUCOSE          RADIOLOGY & ADDITIONAL TESTS:    Notes Reviewed:  [x ] YES  [ ] NO    Care Discussed with Consultants/Other Providers [x ] YES  [ ] NO

## 2024-01-27 NOTE — PHYSICAL THERAPY INITIAL EVALUATION ADULT - ADDITIONAL COMMENTS
Patient reports that she lives in an care home, reports that she ambulates with a RW at baseline, typically independent with ADLs, has assist when needed.

## 2024-01-27 NOTE — CONSULT NOTE ADULT - SUBJECTIVE AND OBJECTIVE BOX
[facility]  INFECTIOUS DISEASES   [address] [place] [address]  Tel: [phone]     Fax: [phone]  ========================================================  MD Zeina Solorzano Kaushal, MD Cho, Michelle, MD Sunjit, Jaspal, MD  ========================================================    MRN-[id number]  JEREMIE PUGH     CC: Fever and chills     HPI:  89yo woman with PMH of HTN, CHF, s/p PPM and colon ca s/p resection in 2004 was admitted from a long care facility with fever and chills.   When I saw her this morning, she didn't have any complaint. Her Great grand niece was at the bedside stating that she is [place] but functional and ambulatroy.   She didn't have any complaint this morning.   She had urinary retention in ED for which had Jones with a cloudy urine, as per niece she didn't have catheter in the past.    UA showed mildly elevated WBC=18 and negative nitrate in urine. Also had leukocytosis of 18k in CBC.   Procalcitonin 0.42    PAST MEDICAL & SURGICAL HISTORY:  Hypertension  Pacemaker  Vertigo  Colon cancer  s/p resection 04  [place] (hard of hearing)  right ear hearing aid  History of colon resection  2004    Social Hx: No current smoking, EtOH or drugs     FAMILY HISTORY:  No pertinent family history in first degree relatives    Allergies  No Known Allergies    MEDICATIONS  (STANDING):  cefTRIAXone   IVPB 1000 milliGRAM(s) IV Intermittent every 24 hours  digoxin     Tablet 62.5 MICROGram(s) Oral daily  diltiazem    milliGRAM(s) Oral daily  furosemide    Tablet 20 milliGRAM(s) Oral daily  heparin   Injectable 5000 Unit(s) SubCutaneous every 12 hours  influenza  Vaccine (HIGH DOSE) 0.7 milliLiter(s) IntraMuscular once  lactobacillus acidophilus 1 Tablet(s) Oral daily    MEDICATIONS  (PRN):  acetaminophen     Tablet .. 650 milliGRAM(s) Oral every 6 hours PRN Temp greater or equal to 38C (100.4F), Mild Pain (1 - 3)  aluminum hydroxide/magnesium hydroxide/simethicone Suspension 30 milliLiter(s) Oral every 4 hours PRN Dyspepsia  melatonin 3 milliGRAM(s) Oral at bedtime PRN Insomnia  ondansetron Injectable 4 milliGRAM(s) IV Push every 8 hours PRN Nausea and/or Vomiting     REVIEW OF SYSTEMS:  CONSTITUTIONAL:  No Fever or chills  HEENT:  No diplopia or blurred vision.  No sore throat or runny nose.  CARDIOVASCULAR:  No chest pain   RESPIRATORY:  No cough, shortness of breath, PND or orthopnea.  GASTROINTESTINAL:  No nausea, vomiting or diarrhea.  GENITOURINARY:  No dysuria, frequency or urgency. No Blood in urine  MUSCULOSKELETAL:  no joint aches, no muscle pain  SKIN:  No change in skin, hair or nails.    Physical Exam:  Vital Signs Last 24 Hrs  T(C): 36.8 (27 Jan 2024 04:52), Max: 37.6 (26 Jan 2024 16:40)  T(F): 98.2 (27 Jan 2024 04:52), Max: 99.7 (26 Jan 2024 16:40)  HR: 63 (27 Jan 2024 04:52) (63 - 89)  BP: 117/71 (27 Jan 2024 04:52) (117/71 - 135/63)  BP(mean): --  RR: 18 (27 Jan 2024 04:52) (16 - 20)  SpO2: 94% (27 Jan 2024 04:52) (93% - 98%)  Parameters below as of 27 Jan 2024 04:52  Patient On (Oxygen Delivery Method): room air  Height (cm): 147.3 (01-26 @ 15:51)  Weight (kg): 59 (01-26 @ 15:51)  BMI (kg/m2): 27.2 (01-26 @ 15:51)  BSA (m2): 1.52 (01-26 @ 15:51)  GEN: NAD  HEENT: normocephalic and atraumatic. EOMI. PERRL.    NECK: Supple.  No lymphadenopathy   LUNGS: Clear to auscultation.  HEART: Regular rate and rhythm   ABDOMEN: Soft, nontender, and nondistended.  Positive bowel sounds.    : No CVA tenderness, jones with cloudy urine   EXTREMITIES: Without edema.  NEUROLOGIC: grossly intact.  PSYCHIATRIC: Appropriate affect .  SKIN: No rash     Labs:  01-27    139  |  106  |  20  ----------------------------<  111<H>  3.0<L>   |  29  |  1.20    Ca    9.4      27 Jan 2024 08:10  Mg     2.0     01-26    TPro  6.5  /  Alb  2.2<L>  /  TBili  0.4  /  DBili  x   /  AST  14<L>  /  ALT  12  /  AlkPhos  92  01-27                        12.1   11.72 )-----------( 315      ( 27 Jan 2024 08:10 )             37.5     PT/INR - ( 26 Jan 2024 16:40 )   PT: 11.4 sec;   INR: 0.97 ratio    PTT - ( 26 Jan 2024 16:40 )  PTT:30.0 sec  Urinalysis Basic - ( 27 Jan 2024 08:10 )    Color: x / Appearance: x / SG: x / pH: x  Gluc: 111 mg/dL / Ketone: x  / Bili: x / Urobili: x   Blood: x / Protein: x / Nitrite: x   Leuk Esterase: x / RBC: x / WBC x   Sq Epi: x / Non Sq Epi: x / Bacteria: x    LIVER FUNCTIONS - ( 27 Jan 2024 08:10 )  Alb: 2.2 g/dL / Pro: 6.5 g/dL / ALK PHOS: 92 U/L / ALT: 12 U/L / AST: 14 U/L / GGT: x           Procalcitonin, Serum: 0.42 ng/mL (01-26-24 @ 23:47)    SARS-CoV-2 Result: NotDetec (01-26-24 @ 18:03)  SARS-CoV-2: NotDetec (01-26-24 @ 18:03)    RECENT CULTURES:  01-26 @ 18:03      NotDetec    All imaging and other data have been reviewed.  < from: CT Abdomen and Pelvis w/ IV Cont (01.26.24 @ 18:48) >  IMPRESSION:  No pulmonary emboli visualized.  Mild focal outpouching of the proximal   pulmonary artery to the left suggesting a small saccular aneurysm. This   is of uncertain etiology. Rare contained rupture of an artery cannot   entirely be excluded. Clinical correlation is suggested.  Bilateral moderate hydroureteronephrosis and distended bladder. Bladder   outlet obstruction must be excluded. Question of bladder wall thickening.   Cystitis cannot be excluded.  The anterior wall of the transverse colon is extending through the   umbilical hernia without gross obstruction.  Cardiomegaly.    Assessment and Plan:   89yo woman with PMH of HTN, CHF, s/p PPM and colon ca s/p resection in 2004 was admitted from a [facility] care facility with fever and chills.   When I saw her this morning, she didn't have any complaint. Her Great grand niece was at the bedside stating that she is [place] but functional and ambulatroy.   She didn't have any complaint this morning.   She had urinary retention in ED for which had Jones with a cloudy urine, as per niece she didn't have catheter in the past.    UA showed mildly elevated WBC=18 and negative nitrate in urine  Leukocytosis of 18k in CBC.   Procalcitonin 0.42    # Urinary retention   # UTI    - Will follow blood and urine cultures   - No MDRO in the past, will continue ceftriaxone 1gm daily  - Jones management as per /primary team  - Based on culture results will modify treatment     Thank you for courtesy of this consult.     Will follow.  Discussed with the primary team.     Howard Wahl MD  Division of Infectious Diseases   Please call ID service at [phone] with any question.    75 minutes spent on total encounter assessing patient, examination, chart review, counseling and coordinating care by the attending physician/nurse/care manager.

## 2024-01-27 NOTE — PHYSICAL THERAPY INITIAL EVALUATION ADULT - NSPTDISCHREC_GEN_A_CORE
patient currently requires 1 person assist for mobility, return to [facility] as long as patient will have assist, rec HPT, has RW. If patient is alone, rec TY.

## 2024-01-27 NOTE — PHARMACOTHERAPY INTERVENTION NOTE - COMMENTS
Patient w/ PMH A Fib is ordered for digoxin 125mcg daily (same as home dose). Patient in OVIDIO, Cr improved from 1.4 to 1.2 today (baseline Cr ~0.58). Dig level this AM resulted as 1.2.  Patient qualifies for 62.5mcg q48hrs based on renal function (CrCl 22 mL/min), however since renal function seems to be improving discussed w/ Dr. Medina and recommended adjusting dose to 62.5mcg daily and obtaining dig level tomorrow AM. MD agreed and order updated.

## 2024-01-28 LAB
ANION GAP SERPL CALC-SCNC: 5 MMOL/L — SIGNIFICANT CHANGE UP (ref 5–17)
BUN SERPL-MCNC: 15 MG/DL — SIGNIFICANT CHANGE UP (ref 7–23)
CALCIUM SERPL-MCNC: 9.2 MG/DL — SIGNIFICANT CHANGE UP (ref 8.5–10.1)
CHLORIDE SERPL-SCNC: 105 MMOL/L — SIGNIFICANT CHANGE UP (ref 96–108)
CO2 SERPL-SCNC: 31 MMOL/L — SIGNIFICANT CHANGE UP (ref 22–31)
CREAT SERPL-MCNC: 1.2 MG/DL — SIGNIFICANT CHANGE UP (ref 0.5–1.3)
DIGOXIN SERPL-MCNC: 1 NG/ML — SIGNIFICANT CHANGE UP (ref 0.8–2)
EGFR: 40 ML/MIN/1.73M2 — LOW
GLUCOSE SERPL-MCNC: 126 MG/DL — HIGH (ref 70–99)
HCT VFR BLD CALC: 36.6 % — SIGNIFICANT CHANGE UP (ref 34.5–45)
HGB BLD-MCNC: 11.9 G/DL — SIGNIFICANT CHANGE UP (ref 11.5–15.5)
MCHC RBC-ENTMCNC: 28.3 PG — SIGNIFICANT CHANGE UP (ref 27–34)
MCHC RBC-ENTMCNC: 32.5 GM/DL — SIGNIFICANT CHANGE UP (ref 32–36)
MCV RBC AUTO: 86.9 FL — SIGNIFICANT CHANGE UP (ref 80–100)
NRBC # BLD: 0 /100 WBCS — SIGNIFICANT CHANGE UP (ref 0–0)
PLATELET # BLD AUTO: 342 K/UL — SIGNIFICANT CHANGE UP (ref 150–400)
POTASSIUM SERPL-MCNC: 3.3 MMOL/L — LOW (ref 3.5–5.3)
POTASSIUM SERPL-SCNC: 3.3 MMOL/L — LOW (ref 3.5–5.3)
RBC # BLD: 4.21 M/UL — SIGNIFICANT CHANGE UP (ref 3.8–5.2)
RBC # FLD: 13.5 % — SIGNIFICANT CHANGE UP (ref 10.3–14.5)
SODIUM SERPL-SCNC: 141 MMOL/L — SIGNIFICANT CHANGE UP (ref 135–145)
WBC # BLD: 9.33 K/UL — SIGNIFICANT CHANGE UP (ref 3.8–10.5)
WBC # FLD AUTO: 9.33 K/UL — SIGNIFICANT CHANGE UP (ref 3.8–10.5)

## 2024-01-28 PROCEDURE — 99232 SBSQ HOSP IP/OBS MODERATE 35: CPT

## 2024-01-28 RX ORDER — HEPARIN SODIUM 5000 [USP'U]/ML
5000 INJECTION INTRAVENOUS; SUBCUTANEOUS EVERY 8 HOURS
Refills: 0 | Status: DISCONTINUED | OUTPATIENT
Start: 2024-01-28 | End: 2024-01-30

## 2024-01-28 RX ORDER — POTASSIUM CHLORIDE 20 MEQ
40 PACKET (EA) ORAL ONCE
Refills: 0 | Status: COMPLETED | OUTPATIENT
Start: 2024-01-28 | End: 2024-01-29

## 2024-01-28 RX ORDER — DILTIAZEM HCL 120 MG
180 CAPSULE, EXT RELEASE 24 HR ORAL DAILY
Refills: 0 | Status: DISCONTINUED | OUTPATIENT
Start: 2024-01-28 | End: 2024-01-30

## 2024-01-28 RX ADMIN — CEFTRIAXONE 100 MILLIGRAM(S): 500 INJECTION, POWDER, FOR SOLUTION INTRAMUSCULAR; INTRAVENOUS at 12:28

## 2024-01-28 RX ADMIN — HEPARIN SODIUM 5000 UNIT(S): 5000 INJECTION INTRAVENOUS; SUBCUTANEOUS at 20:28

## 2024-01-28 RX ADMIN — Medication 10 MILLIEQUIVALENT(S): at 05:44

## 2024-01-28 RX ADMIN — Medication 1 TABLET(S): at 12:28

## 2024-01-28 RX ADMIN — HEPARIN SODIUM 5000 UNIT(S): 5000 INJECTION INTRAVENOUS; SUBCUTANEOUS at 05:43

## 2024-01-28 RX ADMIN — Medication 62.5 MICROGRAM(S): at 10:52

## 2024-01-28 RX ADMIN — Medication 10 MILLIEQUIVALENT(S): at 18:00

## 2024-01-28 RX ADMIN — Medication 3 MILLIGRAM(S): at 20:28

## 2024-01-28 RX ADMIN — Medication 10 MILLIEQUIVALENT(S): at 20:28

## 2024-01-28 RX ADMIN — Medication 180 MILLIGRAM(S): at 05:44

## 2024-01-28 NOTE — PROGRESS NOTE ADULT - SUBJECTIVE AND OBJECTIVE BOX
[facility]  INFECTIOUS DISEASES   [address] [place] [address]  Tel: [phone]     Fax: [phone]  ========================================================  MD Zeina Solorzano Kaushal, MD Cho, Michelle, MD Sunjit, Jaspal, MD  ========================================================    [id number]  JEREMIE PUGH     Follow up: UTI    Awake and alert, very pleasant. No fever, no urinary symptoms, no other complaint.     PAST MEDICAL & SURGICAL HISTORY:  Hypertension  Pacemaker  Vertigo  Colon cancer  s/p resection 04  [place] (hard of hearing)  right ear hearing aid  History of colon resection  2004    Social Hx: No current smoking, EtOH or drugs     FAMILY HISTORY:  No pertinent family history in first degree relatives    Allergies  No Known Allergies    MEDICATIONS  (STANDING):  cefTRIAXone   IVPB 1000 milliGRAM(s) IV Intermittent every 24 hours  digoxin     Tablet 62.5 MICROGram(s) Oral daily  diltiazem    milliGRAM(s) Oral daily  furosemide    Tablet 20 milliGRAM(s) Oral daily  heparin   Injectable 5000 Unit(s) SubCutaneous every 12 hours  influenza  Vaccine (HIGH DOSE) 0.7 milliLiter(s) IntraMuscular once  lactobacillus acidophilus 1 Tablet(s) Oral daily    MEDICATIONS  (PRN):  acetaminophen     Tablet .. 650 milliGRAM(s) Oral every 6 hours PRN Temp greater or equal to 38C (100.4F), Mild Pain (1 - 3)  aluminum hydroxide/magnesium hydroxide/simethicone Suspension 30 milliLiter(s) Oral every 4 hours PRN Dyspepsia  melatonin 3 milliGRAM(s) Oral at bedtime PRN Insomnia  ondansetron Injectable 4 milliGRAM(s) IV Push every 8 hours PRN Nausea and/or Vomiting     REVIEW OF SYSTEMS:  CONSTITUTIONAL:  No Fever or chills  HEENT:  No diplopia or blurred vision.  No sore throat or runny nose.  CARDIOVASCULAR:  No chest pain   RESPIRATORY:  No cough, shortness of breath, PND or orthopnea.  GASTROINTESTINAL:  No nausea, vomiting or diarrhea.  GENITOURINARY:  No dysuria, frequency or urgency. No Blood in urine  MUSCULOSKELETAL:  no joint aches, no muscle pain  SKIN:  No change in skin, hair or nails.    Physical Exam:  Vital Signs Last 24 Hrs  T(C): 36.4 (28 Jan 2024 04:26), Max: 36.4 (28 Jan 2024 04:26)  T(F): 97.6 (28 Jan 2024 04:26), Max: 97.6 (28 Jan 2024 04:26)  HR: 76 (28 Jan 2024 04:26) (70 - 76)  BP: 131/70 (28 Jan 2024 04:26) (127/83 - 134/71)  BP(mean): --  RR: 18 (28 Jan 2024 04:26) (18 - 18)  SpO2: 96% (28 Jan 2024 04:26) (94% - 96%)  Parameters below as of 28 Jan 2024 04:26  Patient On (Oxygen Delivery Method): room air  GEN: NAD  HEENT: normocephalic and atraumatic. EOMI. PERRL.    NECK: Supple.  No lymphadenopathy   LUNGS: Clear to auscultation.  HEART: Regular rate and rhythm   ABDOMEN: Soft, nontender, and nondistended.  Positive bowel sounds.    : No CVA tenderness, jones with cloudy urine   EXTREMITIES: Without edema.  NEUROLOGIC: grossly intact.  PSYCHIATRIC: Appropriate affect .  SKIN: No rash     Labs:                        11.9   9.33  )-----------( 342      ( 28 Jan 2024 08:18 )             36.6     01-28    141  |  105  |  15  ----------------------------<  126<H>  3.3<L>   |  31  |  1.20    Ca    9.2      28 Jan 2024 08:18  Mg     2.0     01-27    TPro  6.5  /  Alb  2.2<L>  /  TBili  0.4  /  DBili  x   /  AST  14<L>  /  ALT  12  /  AlkPhos  92  01-27    Culture - Urine (collected 01-26-24 @ 20:09)  Source: Clean Catch Clean Catch (Midstream)  Preliminary Report (01-28-24 @ 09:09):    50,000 - 99,000 CFU/mL Gram Negative Rods    <10,000 CFU/ml Normal Urogenital kamryn present    Culture - Blood (collected 01-26-24 @ 16:40)  Source: .Blood Blood-Peripheral  Preliminary Report (01-28-24 @ 01:03):    No growth at 24 hours    Culture - Blood (collected 01-26-24 @ 16:30)  Source: .Blood Blood-Peripheral  Preliminary Report (01-28-24 @ 01:03):    No growth at 24 hours    Culture - Blood (collected 03-29-23 @ 21:20)  Source: .Blood  Final Report (04-04-23 @ 01:01):    No Growth Final    Culture - Blood (collected 03-29-23 @ 21:05)  Source: .Blood  Final Report (04-04-23 @ 01:01):    No Growth Final    WBC Count: 9.33 K/uL (01-28-24 @ 08:18)  WBC Count: 11.72 K/uL (01-27-24 @ 08:10)  WBC Count: 18.31 K/uL (01-26-24 @ 16:40)    Creatinine: 1.20 mg/dL (01-28-24 @ 08:18)  Creatinine: 1.20 mg/dL (01-27-24 @ 08:10)  Creatinine: 1.40 mg/dL (01-26-24 @ 16:40)    Procalcitonin, Serum: 0.42 ng/mL (01-26-24 @ 23:47)     SARS-CoV-2 Result: NotDetec (01-26-24 @ 18:03)  SARS-CoV-2: NotDetec (01-26-24 @ 18:03)      All imaging and other data have been reviewed.  < from: CT Abdomen and Pelvis w/ IV Cont (01.26.24 @ 18:48) >  IMPRESSION:  No pulmonary emboli visualized.  Mild focal outpouching of the proximal   pulmonary artery to the left suggesting a small saccular aneurysm. This   is of uncertain etiology. Rare contained rupture of an artery cannot   entirely be excluded. Clinical correlation is suggested.  Bilateral moderate hydroureteronephrosis and distended bladder. Bladder   outlet obstruction must be excluded. Question of bladder wall thickening.   Cystitis cannot be excluded.  The anterior wall of the transverse colon is extending through the   umbilical hernia without gross obstruction.  Cardiomegaly.    Assessment and Plan:   89yo woman with PMH of HTN, CHF, s/p PPM and colon ca s/p resection in 2004 was admitted from a long care facility with fever and chills.   When I saw her this morning, she didn't have any complaint. Her Great grand niece was at the bedside stating that she is [place] but functional and ambulatroy.   She didn't have any complaint this morning.   She had urinary retention in ED for which had Jones with a cloudy urine, as per niece she didn't have catheter in the past.    UA showed mildly elevated WBC=18 and negative nitrate in urine  Leukocytosis of 18k in CBC on admission.   Procalcitonin 0.42    # Urinary retention   # UTI    - Blood cultures NGTD  - UC with GNR 50-99,000 will follow ID and sensitivity   - Continue ceftriaxone 1gm daily  - Jones management as per /primary team  - EBC 18-->9 improved     Will follow.    Howard Wahl MD  Division of Infectious Diseases   Please call ID service at [phone] with any question.    50 minutes spent on total encounter assessing patient, examination, chart review, counseling and coordinating care by the attending physician/nurse/care manager.

## 2024-01-28 NOTE — PROGRESS NOTE ADULT - SUBJECTIVE AND OBJECTIVE BOX
Date of Service 01-28-24 @ 19:07    Patient is a 89y old  Female who presents with a chief complaint of Fever r/o sepsis (28 Jan 2024 11:41)      INTERVAL /OVERNIGHT EVENTS: feeling better    MEDICATIONS  (STANDING):  cefTRIAXone   IVPB 1000 milliGRAM(s) IV Intermittent every 24 hours  digoxin     Tablet 62.5 MICROGram(s) Oral daily  diltiazem    milliGRAM(s) Oral daily  heparin   Injectable 5000 Unit(s) SubCutaneous every 8 hours  influenza  Vaccine (HIGH DOSE) 0.7 milliLiter(s) IntraMuscular once  lactobacillus acidophilus 1 Tablet(s) Oral daily  potassium chloride    Tablet ER 40 milliEquivalent(s) Oral once  potassium chloride    Tablet ER 10 milliEquivalent(s) Oral three times a day    MEDICATIONS  (PRN):  acetaminophen     Tablet .. 650 milliGRAM(s) Oral every 6 hours PRN Temp greater or equal to 38C (100.4F), Mild Pain (1 - 3)  aluminum hydroxide/magnesium hydroxide/simethicone Suspension 30 milliLiter(s) Oral every 4 hours PRN Dyspepsia  melatonin 3 milliGRAM(s) Oral at bedtime PRN Insomnia  ondansetron Injectable 4 milliGRAM(s) IV Push every 8 hours PRN Nausea and/or Vomiting      Allergies    No Known Allergies    Intolerances        REVIEW OF SYSTEMS:  CONSTITUTIONAL: No fever, weight loss, or fatigue  EYES: No eye pain, visual disturbances, or discharge  ENMT:  No difficulty hearing, tinnitus, vertigo; No sinus or throat pain  NECK: No pain or stiffness  RESPIRATORY: No cough, wheezing, chills or hemoptysis; No shortness of breath  CARDIOVASCULAR: No chest pain, palpitations, dizziness, or leg swelling  GASTROINTESTINAL: No abdominal or epigastric pain. No nausea, vomiting, or hematemesis; No diarrhea or constipation. No melena or hematochezia.  GENITOURINARY: No dysuria, frequency, hematuria, or incontinence  NEUROLOGICAL: No headaches, memory loss, loss of strength, numbness, or tremors  SKIN: No itching, burning, rashes, or lesions   LYMPH NODES: No enlarged glands  ENDOCRINE: No heat or cold intolerance; No hair loss; No polydipsia or polyuria  MUSCULOSKELETAL: No joint pain or swelling; No muscle, back, or extremity pain  PSYCHIATRIC: No depression, anxiety, mood swings, or difficulty sleeping  HEME/LYMPH: No easy bruising, or bleeding gums  ALLERGY AND IMMUNOLOGIC: No hives or eczema    Vital Signs Last 24 Hrs  T(C): 36.6 (28 Jan 2024 11:00), Max: 36.6 (28 Jan 2024 11:00)  T(F): 97.9 (28 Jan 2024 11:00), Max: 97.9 (28 Jan 2024 11:00)  HR: 78 (28 Jan 2024 11:00) (76 - 78)  BP: 127/70 (28 Jan 2024 11:00) (127/70 - 134/71)  BP(mean): --  RR: 20 (28 Jan 2024 11:00) (18 - 20)  SpO2: 96% (28 Jan 2024 11:00) (94% - 96%)    Parameters below as of 28 Jan 2024 11:00  Patient On (Oxygen Delivery Method): room air        PHYSICAL EXAM:  GENERAL: NAD, well-groomed, well-developed  HEAD:  Atraumatic, Normocephalic  EYES: EOMI, PERRLA, conjunctiva and sclera clear  ENMT: No tonsillar erythema, exudates, or enlargement; Moist mucous membranes, Good dentition, No lesions  NECK: Supple, No JVD, Normal thyroid  NERVOUS SYSTEM:  Alert & Oriented X3, Good concentration; Motor Strength 5/5 B/L upper and lower extremities; DTRs 2+ intact and symmetric  CHEST/LUNG: Clear to auscultation bilaterally; No rales, rhonchi, wheezing, or rubs  HEART: Regular rate and rhythm; No murmurs, rubs, or gallops  ABDOMEN: Soft, Nontender, Nondistended; Bowel sounds present  EXTREMITIES:  2+ Peripheral Pulses, No clubbing, cyanosis, or edema  LYMPH: No lymphadenopathy noted  SKIN: No rashes or lesions    LABS:                        11.9   9.33  )-----------( 342      ( 28 Jan 2024 08:18 )             36.6     28 Jan 2024 08:18    141    |  105    |  15     ----------------------------<  126    3.3     |  31     |  1.20     Ca    9.2        28 Jan 2024 08:18        Urinalysis Basic - ( 28 Jan 2024 08:18 )    Color: x / Appearance: x / SG: x / pH: x  Gluc: 126 mg/dL / Ketone: x  / Bili: x / Urobili: x   Blood: x / Protein: x / Nitrite: x   Leuk Esterase: x / RBC: x / WBC x   Sq Epi: x / Non Sq Epi: x / Bacteria: x      CAPILLARY BLOOD GLUCOSE          RADIOLOGY & ADDITIONAL TESTS:    Notes Reviewed:  [x ] YES  [ ] NO    Care Discussed with Consultants/Other Providers [x ] YES  [ ] NO

## 2024-01-28 NOTE — OCCUPATIONAL THERAPY INITIAL EVALUATION ADULT - NSOTDISCHREC_GEN_A_CORE
Return to [facility] with assistance with functional activities/transfers. Pt in agreement with d/c plans./Home OT

## 2024-01-28 NOTE — PROGRESS NOTE ADULT - SUBJECTIVE AND OBJECTIVE BOX
[facility]       HPI:  Chart, labs and reports reviewed.  Jenna Carrera is a 89 YO Female referred from long term care facility because of fever.  Patient with poor cognition.  In the ED she had urinary retention.   (26 Jan 2024 23:02)        SUBJECTIVE:      ALLERGIES:  Allergies    No Known Allergies    Intolerances          MEDICATIONS  (STANDING):  cefTRIAXone   IVPB 1000 milliGRAM(s) IV Intermittent every 24 hours  digoxin     Tablet 62.5 MICROGram(s) Oral daily  diltiazem    milliGRAM(s) Oral daily  heparin   Injectable 5000 Unit(s) SubCutaneous every 8 hours  influenza  Vaccine (HIGH DOSE) 0.7 milliLiter(s) IntraMuscular once  lactobacillus acidophilus 1 Tablet(s) Oral daily  potassium chloride    Tablet ER 40 milliEquivalent(s) Oral once  potassium chloride    Tablet ER 10 milliEquivalent(s) Oral three times a day    MEDICATIONS  (PRN):  acetaminophen     Tablet .. 650 milliGRAM(s) Oral every 6 hours PRN Temp greater or equal to 38C (100.4F), Mild Pain (1 - 3)  aluminum hydroxide/magnesium hydroxide/simethicone Suspension 30 milliLiter(s) Oral every 4 hours PRN Dyspepsia  melatonin 3 milliGRAM(s) Oral at bedtime PRN Insomnia  ondansetron Injectable 4 milliGRAM(s) IV Push every 8 hours PRN Nausea and/or Vomiting      REVIEW OF SYSTEMS:  CONSTITUTIONAL: No fever,  RESPIRATORY: No cough, wheezing, shortness of breath  CARDIOVASCULAR: No chest pain, dyspnea, palpitations, dizziness, syncope, paroxysmal nocturnal dyspnea, orthopnea, or arm or leg swelling  GASTROINTESTINAL: No abdominal  or epigastric pain, nausea, vomiting,  diarrhea  NEUROLOGICAL: No headaches,  loss of strength, numbness, or tremors    Vital Signs Last 24 Hrs  T(C): 36.6 (28 Jan 2024 20:21), Max: 36.6 (28 Jan 2024 11:00)  T(F): 97.8 (28 Jan 2024 20:21), Max: 97.9 (28 Jan 2024 11:00)  HR: 99 (28 Jan 2024 20:21) (76 - 99)  BP: 135/78 (28 Jan 2024 20:21) (127/70 - 135/78)  BP(mean): --  RR: 18 (28 Jan 2024 20:21) (18 - 20)  SpO2: 93% (28 Jan 2024 20:21) (93% - 96%)    Parameters below as of 28 Jan 2024 11:00  Patient On (Oxygen Delivery Method): room air        PHYSICAL EXAM:  HEAD:  Atraumatic, Normocephalic  NECK: Supple and normal thyroid.  No JVD or carotid bruit.   HEART: S1, S2 regular , 1/6 soft ejection systolic murmur in mitral area , no thrill and no gallops .  PULMONARY: Bilateral vesicular breathing , few scattered ronchi and few scattered rales are present .  ABDOMEN: Soft nontender and positive bowl sounds   EXTREMITIES:  No clubbing, cyanosis, or pedal  edema  NEUROLOGICAL: AAOX3 , no focal deficit .    LABS:                        11.9   9.33  )-----------( 342      ( 28 Jan 2024 08:18 )             36.6     01-28    141  |  105  |  15  ----------------------------<  126<H>  3.3<L>   |  31  |  1.20    Ca    9.2      28 Jan 2024 08:18  Mg     2.0     01-27    TPro  6.5  /  Alb  2.2<L>  /  TBili  0.4  /  DBili  x   /  AST  14<L>  /  ALT  12  /  AlkPhos  92  01-27          Urinalysis Basic - ( 28 Jan 2024 08:18 )    Color: x / Appearance: x / SG: x / pH: x  Gluc: 126 mg/dL / Ketone: x  / Bili: x / Urobili: x   Blood: x / Protein: x / Nitrite: x   Leuk Esterase: x / RBC: x / WBC x   Sq Epi: x / Non Sq Epi: x / Bacteria: x      BNP      EKG:  ECHO:  IMAGING:    Assessment/Plan    Will continue to follow during hospital course and give further recommendations as needed . Thanks for your referral . if any questions please contact me at office ([phone] [phone])  SOTO MURILLO MD [place] [id number]  [address]  OFFICE : [phone]  CELL : [phone]  CARDIOLOGY F/U :      HPI:  Chart, labs and reports reviewed.  Jenna Carrera is a 89 YO Female referred from long term care facility because of fever.  Patient with poor cognition.  In the ED she had urinary retention.   (26 Jan 2024 23:02)        SUBJECTIVE: Patient feeling better .      ALLERGIES:  Allergies    No Known Allergies    Intolerances          MEDICATIONS  (STANDING):  cefTRIAXone   IVPB 1000 milliGRAM(s) IV Intermittent every 24 hours  digoxin     Tablet 62.5 MICROGram(s) Oral daily  diltiazem    milliGRAM(s) Oral daily  heparin   Injectable 5000 Unit(s) SubCutaneous every 8 hours  influenza  Vaccine (HIGH DOSE) 0.7 milliLiter(s) IntraMuscular once  lactobacillus acidophilus 1 Tablet(s) Oral daily  potassium chloride    Tablet ER 40 milliEquivalent(s) Oral once  potassium chloride    Tablet ER 10 milliEquivalent(s) Oral three times a day    MEDICATIONS  (PRN):  acetaminophen     Tablet .. 650 milliGRAM(s) Oral every 6 hours PRN Temp greater or equal to 38C (100.4F), Mild Pain (1 - 3)  aluminum hydroxide/magnesium hydroxide/simethicone Suspension 30 milliLiter(s) Oral every 4 hours PRN Dyspepsia  melatonin 3 milliGRAM(s) Oral at bedtime PRN Insomnia  ondansetron Injectable 4 milliGRAM(s) IV Push every 8 hours PRN Nausea and/or Vomiting      REVIEW OF SYSTEMS:  CONSTITUTIONAL: No fever,  RESPIRATORY: No cough, wheezing, shortness of breath  CARDIOVASCULAR: No chest pain, dyspnea, palpitations, dizziness, syncope, paroxysmal nocturnal dyspnea, orthopnea, or arm or leg swelling  GASTROINTESTINAL: No abdominal  or epigastric pain, nausea, vomiting,  diarrhea  NEUROLOGICAL: No headaches,  numbness, or tremors  Skin : No itching .  Hematology : No active bleeding .  Endocrinology : No heat and cold intolerance .  Psychiatry : Patient is mild confused but calm .  Genitourinary : No dysuria .  Musculoskeletal : No joint swellings .    Vital Signs Last 24 Hrs  T(C): 36.6 (28 Jan 2024 20:21), Max: 36.6 (28 Jan 2024 11:00)  T(F): 97.8 (28 Jan 2024 20:21), Max: 97.9 (28 Jan 2024 11:00)  HR: 99 (28 Jan 2024 20:21) (76 - 99)  BP: 135/78 (28 Jan 2024 20:21) (127/70 - 135/78)  BP(mean): --  RR: 18 (28 Jan 2024 20:21) (18 - 20)  SpO2: 93% (28 Jan 2024 20:21) (93% - 96%)    Parameters below as of 28 Jan 2024 11:00  Patient On (Oxygen Delivery Method): room air        PHYSICAL EXAM:  HEAD:  Atraumatic, Normocephalic  NECK: Supple and normal thyroid.  No JVD or carotid bruit.   HEART: S1, S2 irregular , 1/6 soft ejection systolic murmur in mitral area , no thrill and no gallops .  PULMONARY: Bilateral vesicular breathing , few scattered rhonchi and few scattered rales are present .  ABDOMEN: Soft nontender and positive bowl sounds   EXTREMITIES:  No clubbing, cyanosis, or pedal  edema  NEUROLOGICAL: AA , no focal deficit .  Skin : No rashes .  Musculoskeletal : No joint swellings .    LABS:                        11.9   9.33  )-----------( 342      ( 28 Jan 2024 08:18 )             36.6     01-28    141  |  105  |  15  ----------------------------<  126<H>  3.3<L>   |  31  |  1.20    Ca    9.2      28 Jan 2024 08:18  Mg     2.0     01-27    TPro  6.5  /  Alb  2.2<L>  /  TBili  0.4  /  DBili  x   /  AST  14<L>  /  ALT  12  /  AlkPhos  92  01-27          Urinalysis Basic - ( 28 Jan 2024 08:18 )    Color: x / Appearance: x / SG: x / pH: x  Gluc: 126 mg/dL / Ketone: x  / Bili: x / Urobili: x   Blood: x / Protein: x / Nitrite: x   Leuk Esterase: x / RBC: x / WBC x   Sq Epi: x / Non Sq Epi: x / Bacteria: x      Assessment/Plan  1) UTI and better .  2) Paroxysmal atrial fibrillation and stable .  3) Mild chronic diastolic heart failure and stable .  4) Hypertension and stable   5) Mild elevated Troponin I are due to demand ischemia and not NON-ST GLADIS as primary event .  6) Dementia  7) Hypokalemia   Plan : 1) I/V antibiotics as per ID 2) Patient high risk for full anticoagulation due to risk of fall and bleeding , continue aspirin 81 mg daily 3) Monitor hemoglobin and electrolytes 4) Rest of medications as such .  Will continue to follow during hospital course and give further recommendations as needed . Thanks for your referral . if any questions please contact me at office ([phone] cell [phone]  )

## 2024-01-28 NOTE — CHART NOTE - NSCHARTNOTEFT_GEN_A_CORE
Do you have Advance Directives (HCP / LV / Organ donation / Documentation of oral advance Directive):   (   x )  yes    (      )    NO                                                                            Do you have LV - Living will :                                                                                                                                             (  x  )  yes    (      )   No    Do you have HCP - Health Care Proxy:                                                                                                                            (  x   )  yes   (       ) N0    Do you have DNR- Do Not Resuscitate :                                                                                                                           (   x   )  yes  (        )  No    Do you have DNI- Do Not intubate  :                                                                                                                               (   x   )  yes   (       ) No    Do you have MOLST - Medical orders for Life sustaining treatment  :                                                                    (   x   ) yes    (       ) No  x  Decision Maker :  (     ) Patient     (   x   )  [facility]CA   (     ) Public Health Law Surrogate     (      ) Surrogate  (       ) Guardian    Goals of Care :  (      )   Complete Care     (       ) No Limitations                              (       )   Comfort Care       (       )  Hospice                               (   x   )   Limited medical Intervention / s    Medical Interventions :   (        )   CPR       (      x  )  DNR                                               (        )  Intubation with MV - Mechanical Ventilation  (      ) BIPAP/CPAP    (     x    )   DNI                                               (         )  Artificial Nutrition -  IVF, TPN / PPN, Tube Feeds             (      x   )   No Feeding Tube                                                (     x   ) Use Antibiotics                         (          ) No Antibiotics                                                (     x    ) Blood and Blood Products     (         )   No Blood or Blood products                                                (      x    )  Dialysis                                    (         )  No Dialysis                                                (          )  Medical Management only  (         )  No Invasive Interventions or Surgery  Time spent :                        (    x   ) upto 30 minutes                       (           )   more than 30 minutes  ACP reviewed and discussed

## 2024-01-28 NOTE — OCCUPATIONAL THERAPY INITIAL EVALUATION ADULT - ADL RETRAINING, OT EVAL
Pt will complete LB dressing, with AE as needed, with MinAx1 by 2-3 sessions. Pt will complete grooming activity while standing at the sink +RW by 2-3 sessions.

## 2024-01-28 NOTE — OCCUPATIONAL THERAPY INITIAL EVALUATION ADULT - PERTINENT HX OF CURRENT PROBLEM, REHAB EVAL
As per H&P: "89 YO Female referred from long term care facility because of fever.  Patient with poor cognition.  In the ED she had urinary retention."  Admission dx: UTI  CT Head: negative neuro event

## 2024-01-28 NOTE — PROGRESS NOTE ADULT - SUBJECTIVE AND OBJECTIVE BOX
Subjective: NAD      MEDICATIONS  (STANDING):  cefTRIAXone   IVPB 1000 milliGRAM(s) IV Intermittent every 24 hours  digoxin     Tablet 62.5 MICROGram(s) Oral daily  diltiazem    milliGRAM(s) Oral daily  heparin   Injectable 5000 Unit(s) SubCutaneous every 12 hours  influenza  Vaccine (HIGH DOSE) 0.7 milliLiter(s) IntraMuscular once  lactobacillus acidophilus 1 Tablet(s) Oral daily  potassium chloride    Tablet ER 40 milliEquivalent(s) Oral once  potassium chloride    Tablet ER 10 milliEquivalent(s) Oral three times a day    MEDICATIONS  (PRN):  acetaminophen     Tablet .. 650 milliGRAM(s) Oral every 6 hours PRN Temp greater or equal to 38C (100.4F), Mild Pain (1 - 3)  aluminum hydroxide/magnesium hydroxide/simethicone Suspension 30 milliLiter(s) Oral every 4 hours PRN Dyspepsia  melatonin 3 milliGRAM(s) Oral at bedtime PRN Insomnia  ondansetron Injectable 4 milliGRAM(s) IV Push every 8 hours PRN Nausea and/or Vomiting          T(C): 36.6 (01-28-24 @ 11:00), Max: 36.6 (01-28-24 @ 11:00)  HR: 78 (01-28-24 @ 11:00) (70 - 78)  BP: 127/70 (01-28-24 @ 11:00) (127/70 - 134/71)  RR: 20 (01-28-24 @ 11:00) (18 - 20)  SpO2: 96% (01-28-24 @ 11:00) (94% - 96%)  Wt(kg): --        I&O's Detail    27 Jan 2024 07:01  -  28 Jan 2024 07:00  --------------------------------------------------------  IN:  Total IN: 0 mL    OUT:    Indwelling Catheter - Urethral (mL): 2750 mL    Voided (mL): 1000 mL  Total OUT: 3750 mL    Total NET: -3750 mL               PHYSICAL EXAM:      GENERAL: NAD  HEAD:  Atraumatic, normocephalic  CHEST/LUNG: Clear to auscultation bilaterally  HEART: Regular rate and rhythm. No murmurs, rubs, or gallops  ABDOMEN: Soft, Nontender, Nondistended. POS BS  EXTREMITIES:  no edema        LABS:  CBC Full  -  ( 28 Jan 2024 08:18 )  WBC Count : 9.33 K/uL  RBC Count : 4.21 M/uL  Hemoglobin : 11.9 g/dL  Hematocrit : 36.6 %  Platelet Count - Automated : 342 K/uL  Mean Cell Volume : 86.9 fl  Mean Cell Hemoglobin : 28.3 pg  Mean Cell Hemoglobin Concentration : 32.5 gm/dL  Auto Neutrophil # : x  Auto Lymphocyte # : x  Auto Monocyte # : x  Auto Eosinophil # : x  Auto Basophil # : x  Auto Neutrophil % : x  Auto Lymphocyte % : x  Auto Monocyte % : x  Auto Eosinophil % : x  Auto Basophil % : x    01-28    141  |  105  |  15  ----------------------------<  126<H>  3.3<L>   |  31  |  1.20    Ca    9.2      28 Jan 2024 08:18  Mg     2.0     01-27    TPro  6.5  /  Alb  2.2<L>  /  TBili  0.4  /  DBili  x   /  AST  14<L>  /  ALT  12  /  AlkPhos  92  01-27    PT/INR - ( 26 Jan 2024 16:40 )   PT: 11.4 sec;   INR: 0.97 ratio             Impression:  * OVIDIO -- pre and post-renal. No evidence of contrast ATN post CTA on 1/26  * HypoNa -- hypovolemic  * B/L hydro  * Urosepsis    Recommendations:   * Hold diuretic.  * Check bladder scan  * Yoo for PVR > 300cc  * Urology to address hydro.

## 2024-01-28 NOTE — OCCUPATIONAL THERAPY INITIAL EVALUATION ADULT - PERSONAL SAFETY AND JUDGMENT, REHAB EVAL
LOV: 4/7/2022  NOV: 5/20/2022  Last fill: 4/8/2022 #60 NR    Per LOV note:  3. Hypothyroidism.  Recent TSH around 8. Dose of levothyroxine was adjusted.  She is taking medication on empty stomach.  TSH recheck in 6 weeks.  Increase levothyroxine to 125 mcg/d 5 d/w 150 mcg/d 2d/w      Denied refill-pt had refill on 4/7/2022- which should last for 3 months with the new dose     Pt with impulsive behavior. Requires verbal cueing to maintain safety within RW and for hospital safety./impaired

## 2024-01-28 NOTE — OCCUPATIONAL THERAPY INITIAL EVALUATION ADULT - ADDITIONAL COMMENTS
Pt reports that she lives in an [place] and that she ambulates with a RW at baseline. Pt reports she required assistance with LB dressing but was typically independent with all other ADLs. Pt reports she has assistance when needed.  Pt completed side-steps at EOB with RW with MinAx1 +VC.

## 2024-01-28 NOTE — OCCUPATIONAL THERAPY INITIAL EVALUATION ADULT - DIAGNOSIS, OT EVAL
Pt with decreased strength, balance, and endurance  impacting ability to complete ADLs, IADLs, functional mobility/transfers.

## 2024-01-29 LAB
-  AMOXICILLIN/CLAVULANIC ACID: SIGNIFICANT CHANGE UP
-  AMPICILLIN/SULBACTAM: SIGNIFICANT CHANGE UP
-  AMPICILLIN: SIGNIFICANT CHANGE UP
-  AZTREONAM: SIGNIFICANT CHANGE UP
-  CEFAZOLIN: SIGNIFICANT CHANGE UP
-  CEFEPIME: SIGNIFICANT CHANGE UP
-  CEFOXITIN: SIGNIFICANT CHANGE UP
-  CEFTRIAXONE: SIGNIFICANT CHANGE UP
-  CEFUROXIME: SIGNIFICANT CHANGE UP
-  CIPROFLOXACIN: SIGNIFICANT CHANGE UP
-  ERTAPENEM: SIGNIFICANT CHANGE UP
-  GENTAMICIN: SIGNIFICANT CHANGE UP
-  LEVOFLOXACIN: SIGNIFICANT CHANGE UP
-  MEROPENEM: SIGNIFICANT CHANGE UP
-  NITROFURANTOIN: SIGNIFICANT CHANGE UP
-  PIPERACILLIN/TAZOBACTAM: SIGNIFICANT CHANGE UP
-  TOBRAMYCIN: SIGNIFICANT CHANGE UP
-  TRIMETHOPRIM/SULFAMETHOXAZOLE: SIGNIFICANT CHANGE UP
ANION GAP SERPL CALC-SCNC: 9 MMOL/L — SIGNIFICANT CHANGE UP (ref 5–17)
BUN SERPL-MCNC: 10 MG/DL — SIGNIFICANT CHANGE UP (ref 7–23)
CALCIUM SERPL-MCNC: 9.6 MG/DL — SIGNIFICANT CHANGE UP (ref 8.5–10.1)
CHLORIDE SERPL-SCNC: 103 MMOL/L — SIGNIFICANT CHANGE UP (ref 96–108)
CO2 SERPL-SCNC: 29 MMOL/L — SIGNIFICANT CHANGE UP (ref 22–31)
CREAT SERPL-MCNC: 1.1 MG/DL — SIGNIFICANT CHANGE UP (ref 0.5–1.3)
CULTURE RESULTS: ABNORMAL
EGFR: 44 ML/MIN/1.73M2 — LOW
GLUCOSE SERPL-MCNC: 134 MG/DL — HIGH (ref 70–99)
METHOD TYPE: SIGNIFICANT CHANGE UP
ORGANISM # SPEC MICROSCOPIC CNT: ABNORMAL
ORGANISM # SPEC MICROSCOPIC CNT: SIGNIFICANT CHANGE UP
POTASSIUM SERPL-MCNC: 3.4 MMOL/L — LOW (ref 3.5–5.3)
POTASSIUM SERPL-SCNC: 3.4 MMOL/L — LOW (ref 3.5–5.3)
SODIUM SERPL-SCNC: 141 MMOL/L — SIGNIFICANT CHANGE UP (ref 135–145)
SPECIMEN SOURCE: SIGNIFICANT CHANGE UP

## 2024-01-29 PROCEDURE — 99232 SBSQ HOSP IP/OBS MODERATE 35: CPT

## 2024-01-29 RX ORDER — POTASSIUM CHLORIDE 20 MEQ
40 PACKET (EA) ORAL ONCE
Refills: 0 | Status: COMPLETED | OUTPATIENT
Start: 2024-01-29 | End: 2024-01-29

## 2024-01-29 RX ORDER — ASPIRIN/CALCIUM CARB/MAGNESIUM 324 MG
81 TABLET ORAL DAILY
Refills: 0 | Status: DISCONTINUED | OUTPATIENT
Start: 2024-01-29 | End: 2024-01-30

## 2024-01-29 RX ORDER — CEFTRIAXONE 500 MG/1
1000 INJECTION, POWDER, FOR SOLUTION INTRAMUSCULAR; INTRAVENOUS EVERY 24 HOURS
Refills: 0 | Status: DISCONTINUED | OUTPATIENT
Start: 2024-01-30 | End: 2024-01-30

## 2024-01-29 RX ADMIN — Medication 180 MILLIGRAM(S): at 06:04

## 2024-01-29 RX ADMIN — Medication 10 MILLIEQUIVALENT(S): at 06:04

## 2024-01-29 RX ADMIN — Medication 10 MILLIEQUIVALENT(S): at 21:13

## 2024-01-29 RX ADMIN — HEPARIN SODIUM 5000 UNIT(S): 5000 INJECTION INTRAVENOUS; SUBCUTANEOUS at 06:04

## 2024-01-29 RX ADMIN — Medication 62.5 MICROGRAM(S): at 06:04

## 2024-01-29 RX ADMIN — CEFTRIAXONE 100 MILLIGRAM(S): 500 INJECTION, POWDER, FOR SOLUTION INTRAMUSCULAR; INTRAVENOUS at 12:33

## 2024-01-29 RX ADMIN — Medication 40 MILLIEQUIVALENT(S): at 12:30

## 2024-01-29 RX ADMIN — HEPARIN SODIUM 5000 UNIT(S): 5000 INJECTION INTRAVENOUS; SUBCUTANEOUS at 21:15

## 2024-01-29 RX ADMIN — HEPARIN SODIUM 5000 UNIT(S): 5000 INJECTION INTRAVENOUS; SUBCUTANEOUS at 14:09

## 2024-01-29 RX ADMIN — Medication 10 MILLIEQUIVALENT(S): at 14:11

## 2024-01-29 RX ADMIN — Medication 81 MILLIGRAM(S): at 12:30

## 2024-01-29 RX ADMIN — Medication 1 TABLET(S): at 12:30

## 2024-01-29 NOTE — DIETITIAN INITIAL EVALUATION ADULT - PERTINENT LABORATORY DATA
01-29    141  |  103  |  10  ----------------------------<  134<H>  3.4<L>   |  29  |  1.10    Ca    9.6      29 Jan 2024 06:06  Mg     2.0     01-27    A1C with Estimated Average Glucose Result: 6.6 % (01-26-24 @ 16:40)

## 2024-01-29 NOTE — DIETITIAN INITIAL EVALUATION ADULT - NSICDXPASTMEDICALHX_GEN_ALL_CORE_FT
PAST MEDICAL HISTORY:  Colon cancer s/p resection 04    [place] (hard of hearing) right ear hearing aid    Hypertension     Pacemaker     Vertigo

## 2024-01-29 NOTE — PROGRESS NOTE ADULT - PROBLEM SELECTOR PLAN 1
2/2 to UTI  F/u culture data  S/P iv abx  ID f/u 2/2 to UTI  F/u culture data  Continue iv Rocephin pending culture results  ID f/u

## 2024-01-29 NOTE — CARE COORDINATION ASSESSMENT. - NSCAREPROVIDERS_GEN_ALL_CORE_FT
CARE PROVIDERS:  Accepting Physician: Toni Medina  Administration: Jayy Sanchez  Administration: Pilo Siddiqui  Administration: James Almonte  Admitting: Toni Medina  Attending: Jefry Gurrola  Case Management: Violette Farfan  Consultant: Bryan Almonte  Consultant: Trent Sanchez  Consultant: Rufino Murray  Consultant: Howard Wahl  Consultant: Mary Salazar  Covering Team: Ashley Howell  ED Attending: Herbert Schwartz  ED Nurse: Yazmin Armstrong  Nurse: Elvia Epps  Nurse: Rowena Ramirez  Ordered: ADM, User  Outpatient Provider: Trent Sanchez  Outpatient Provider: Micheal Anaya  Override: Meera Cottrell  PCA/Nursing Assistant: Tara Brito  PCA/Nursing Assistant: Elizabeth Ordaz  Physical Therapy: Shiv Rothman  Physical Therapy: Angélica Zacarias  Quality Review: Kyleigh Avalos  Registered Dietitian: Tiffany Nugent  : Arlin Villarreal  : Concetta Casey  : Fatou Gerber

## 2024-01-29 NOTE — CARE COORDINATION ASSESSMENT. - OTHER PERTINENT DISCHARGE PLANNING INFORMATION:
Met with patient at bedside and spoke with tere Diego over telephone. Patient lives in main floor apartment ([address]) with 24/7 split shift Medicaid HHA's through the [employer](nursing home diversion program) Met with patient at bedside and spoke with niece Brandie over telephone. Patient lives in main floor apartment ([address]) with 24/7 split shift Medicaid HHA's through the [employer](nursing home diversion program).  @ [facility] is Ganesh [employer]one [employer]@[phone] and HHA is provided by [employer] and CM at [facility] is Antoni @ [phone]. [facility] has the HHA's "on  standby" for tomorrow and will need DC summary faxed to [phone]. Ganesh will also need DC summary faxed to her @ [phone]. Patient ambulates with RW PTA. Patient's niece Brandie is away till tomorrow evening but requests to be called for any dc planning. If pt is D/C'd prior to her being home at 5pm tomorrow, she needs pt's HHA to be at pt's apartment first and then patient's friend Eh can transport pt home. CM will continue to follow. CM contact information provided to pt and niece. Transition information provided to patient.

## 2024-01-29 NOTE — PATIENT CHOICE NOTE. - NSPTCHOICESTATE_GEN_ALL_CORE

## 2024-01-29 NOTE — PROGRESS NOTE ADULT - ASSESSMENT
* OVIDIO, Urinary retention with b/l hydronephrosis  * HypoNa -- hypovolemic  * Urosepsis    Improved renal indices. For TOV.  evaluation if pt fails TOV. Sodium levels better.   To continue current meds. Will follow electrolytes and renal function trend.

## 2024-01-29 NOTE — PROGRESS NOTE ADULT - SUBJECTIVE AND OBJECTIVE BOX
Date of Service: 01-29-24 @ 13:15    Patient is a 89y old  Female who presents with a chief complaint of Urinary tract infection     (29 Jan 2024 10:32)      INTERVAL HPI/OVERNIGHT EVENTS: Patient seen and examined. NAD. No complaints.    Vital Signs Last 24 Hrs  T(C): 36.7 (29 Jan 2024 12:05), Max: 36.7 (29 Jan 2024 12:05)  T(F): 98.1 (29 Jan 2024 12:05), Max: 98.1 (29 Jan 2024 12:05)  HR: 74 (29 Jan 2024 12:05) (74 - 99)  BP: 122/65 (29 Jan 2024 12:05) (122/65 - 147/79)  BP(mean): --  RR: 19 (29 Jan 2024 12:05) (18 - 19)  SpO2: 94% (29 Jan 2024 12:05) (93% - 94%)    Parameters below as of 29 Jan 2024 12:05  Patient On (Oxygen Delivery Method): room air        01-29    141  |  103  |  10  ----------------------------<  134<H>  3.4<L>   |  29  |  1.10    Ca    9.6      29 Jan 2024 06:06  Mg     2.0     01-27                            11.9   9.33  )-----------( 342      ( 28 Jan 2024 08:18 )             36.6       CAPILLARY BLOOD GLUCOSE        Urinalysis Basic - ( 29 Jan 2024 06:06 )    Color: x / Appearance: x / SG: x / pH: x  Gluc: 134 mg/dL / Ketone: x  / Bili: x / Urobili: x   Blood: x / Protein: x / Nitrite: x   Leuk Esterase: x / RBC: x / WBC x   Sq Epi: x / Non Sq Epi: x / Bacteria: x              acetaminophen     Tablet .. 650 milliGRAM(s) Oral every 6 hours PRN  aluminum hydroxide/magnesium hydroxide/simethicone Suspension 30 milliLiter(s) Oral every 4 hours PRN  aspirin enteric coated 81 milliGRAM(s) Oral daily  digoxin     Tablet 62.5 MICROGram(s) Oral daily  diltiazem    milliGRAM(s) Oral daily  heparin   Injectable 5000 Unit(s) SubCutaneous every 8 hours  influenza  Vaccine (HIGH DOSE) 0.7 milliLiter(s) IntraMuscular once  lactobacillus acidophilus 1 Tablet(s) Oral daily  melatonin 3 milliGRAM(s) Oral at bedtime PRN  ondansetron Injectable 4 milliGRAM(s) IV Push every 8 hours PRN  potassium chloride    Tablet ER 10 milliEquivalent(s) Oral three times a day              REVIEW OF SYSTEMS:  CONSTITUTIONAL: No fever, no weight loss, or no fatigue  NECK: No pain, no stiffness  RESPIRATORY: No cough, no wheezing, no chills, no hemoptysis, No shortness of breath  CARDIOVASCULAR: No chest pain, no palpitations, no dizziness, no leg swelling  GASTROINTESTINAL: No abdominal pain. No nausea, no vomiting, no hematemesis; No diarrhea, no constipation. No melena, no hematochezia.  GENITOURINARY: No dysuria, no frequency, no hematuria, no incontinence  NEUROLOGICAL: No headaches, no loss of strength, no numbness, no tremors  SKIN: No itching, no burning  MUSCULOSKELETAL: No joint pain, no swelling; No muscle, no back, no extremity pain  PSYCHIATRIC: No depression, no mood swings,   HEME/LYMPH: No easy bruising, no bleeding gums  ALLERY AND IMMUNOLOGIC: No hives       Consultant(s) Notes Reviewed:  [X] YES  [ ] NO    PHYSICAL EXAM:  GENERAL: NAD  HEAD:  Atraumatic, Normocephalic  EYES: EOMI, PERRLA, conjunctiva and sclera clear  ENMT: No tonsillar erythema, exudates, or enlargement; Moist mucous membranes  NECK: Supple, No JVD  NERVOUS SYSTEM:  Awake & alert  CHEST/LUNG: Clear to auscultation bilaterally; No rales, rhonchi, wheezing,  HEART: Regular rate and rhythm  ABDOMEN: Soft, Nontender, Nondistended; Bowel sounds present  EXTREMITIES:  No clubbing, cyanosis, or edema  LYMPH: No lymphadenopathy noted  SKIN: No rashes      Advanced care planning discussed with patient/family [X] YES   [ ] NO    Advanced care planning discussed with patient/family. Patient's health status was discussed. All appropriate changes have been made regarding patient's end-of-life care. Advanced care planning forms reviewed/discussed/completed.  20 minutes spent.

## 2024-01-29 NOTE — DIETITIAN INITIAL EVALUATION ADULT - PROBLEM SELECTOR PLAN 6
Heparin for DVT prevention  influenza  Vaccine (HIGH DOSE) 0.7 milliLiter(s) IntraMuscular once  lactobacillus acidophilus 1 Tablet(s) Oral daily  potassium chloride    Tablet ER 10 milliEquivalent(s) Oral three times a day:  acetaminophen     Tablet .. 650 milliGRAM(s) Oral every 6 hours PRN Temp greater or equal to 38C (100.4F), Mild Pain (1 - 3)  aluminum hydroxide/magnesium hydroxide/simethicone Suspension 30 milliLiter(s) Oral every 4 hours PRN Dyspepsia  melatonin 3 milliGRAM(s) Oral at bedtime PRN Insomnia  ondansetron Injectable 4 milliGRAM(s) IV Push every 8 hours PRN Nausea and/or Vomiting

## 2024-01-29 NOTE — GOALS OF CARE CONVERSATION - ADVANCED CARE PLANNING - CONVERSATION DETAILS
Palliative care SW spoke with patient's niece, Brandie, by phone. Reviewed patient's medical and social history as well as events leading to patient's hospitalization. Writer discussed patient's current diagnosis (UTI, fever, urinary retention, Afib, HTN, weakness, Telida), medical condition and management. Niece reports patient has a HCP and POA and she is patient's health care agent.  Inquired about patient's wishes regarding extent of medical care to be provided including  thoughts regarding cardiopulmonary resuscitation and mechanical ventilation/intubation. Bobby stated that given patient's advanced age she would not want her to go through CPR or intubation/mechanical ventilation. Writer recommended completion of MOLST form. MOLST completed with DNR/DNI orders and placed on chart. Bobby stated that patient  lives at home with private hire 24/7 A assistance. Her goal is for patient to return to her home. Margeece showed insight into medical condition. All questions answered.  Psychosocial support provided.

## 2024-01-29 NOTE — PROGRESS NOTE ADULT - SUBJECTIVE AND OBJECTIVE BOX
Patient is a 89y old  Female who presents with a chief complaint of Fever r/o sepsis (29 Jan 2024 14:21)    Patient seen in follow up for       PAST MEDICAL HISTORY:  Hypertension    Pacemaker    Vertigo    Colon cancer    [place] (hard of hearing)      MEDICATIONS  (STANDING):  aspirin enteric coated 81 milliGRAM(s) Oral daily  digoxin     Tablet 62.5 MICROGram(s) Oral daily  diltiazem    milliGRAM(s) Oral daily  heparin   Injectable 5000 Unit(s) SubCutaneous every 8 hours  influenza  Vaccine (HIGH DOSE) 0.7 milliLiter(s) IntraMuscular once  lactobacillus acidophilus 1 Tablet(s) Oral daily  potassium chloride    Tablet ER 10 milliEquivalent(s) Oral three times a day    MEDICATIONS  (PRN):  acetaminophen     Tablet .. 650 milliGRAM(s) Oral every 6 hours PRN Temp greater or equal to 38C (100.4F), Mild Pain (1 - 3)  aluminum hydroxide/magnesium hydroxide/simethicone Suspension 30 milliLiter(s) Oral every 4 hours PRN Dyspepsia  melatonin 3 milliGRAM(s) Oral at bedtime PRN Insomnia  ondansetron Injectable 4 milliGRAM(s) IV Push every 8 hours PRN Nausea and/or Vomiting    T(C): 36.7 (01-29-24 @ 12:05), Max: 36.7 (01-29-24 @ 12:05)  HR: 74 (01-29-24 @ 12:05) (74 - 99)  BP: 122/65 (01-29-24 @ 12:05) (122/65 - 147/79)  RR: 19 (01-29-24 @ 12:05) (18 - 20)  SpO2: 94% (01-29-24 @ 12:05) (93% - 96%)  Wt(kg): --  I&O's Detail    28 Jan 2024 07:01  -  29 Jan 2024 07:00  --------------------------------------------------------  IN:  Total IN: 0 mL    OUT:    Indwelling Catheter - Urethral (mL): 1800 mL  Total OUT: 1800 mL    Total NET: -1800 mL          PHYSICAL EXAM:  General: No distress  Respiratory: b/l air entry  Cardiovascular: S1 S2  Gastrointestinal: soft  Extremities:  edema                              11.9   9.33  )-----------( 342      ( 28 Jan 2024 08:18 )             36.6     01-29    141  |  103  |  10  ----------------------------<  134<H>  3.4<L>   |  29  |  1.10    Ca    9.6      29 Jan 2024 06:06            Urinalysis Basic - ( 29 Jan 2024 06:06 )    Color: x / Appearance: x / SG: x / pH: x  Gluc: 134 mg/dL / Ketone: x  / Bili: x / Urobili: x   Blood: x / Protein: x / Nitrite: x   Leuk Esterase: x / RBC: x / WBC x   Sq Epi: x / Non Sq Epi: x / Bacteria: x        Sodium, Serum: 141 (01-29 @ 06:06)  Sodium, Serum: 141 (01-28 @ 08:18)  Sodium, Serum: 139 (01-27 @ 08:10)  Sodium, Serum: 130 (01-26 @ 16:40)    Creatinine, Serum: 1.10 (01-29 @ 06:06)  Creatinine, Serum: 1.20 (01-28 @ 08:18)  Creatinine, Serum: 1.20 (01-27 @ 08:10)  Creatinine, Serum: 1.40 (01-26 @ 16:40)    Potassium, Serum: 3.4 (01-29 @ 06:06)  Potassium, Serum: 3.3 (01-28 @ 08:18)  Potassium, Serum: 3.0 (01-27 @ 08:10)  Potassium, Serum: 3.5 (01-26 @ 16:40)    Hemoglobin: 11.9 (01-28 @ 08:18)  Hemoglobin: 12.1 (01-27 @ 08:10)  Hemoglobin: 12.4 (01-26 @ 16:40)

## 2024-01-29 NOTE — DIETITIAN INITIAL EVALUATION ADULT - PERTINENT MEDS FT
MEDICATIONS  (STANDING):  aspirin enteric coated 81 milliGRAM(s) Oral daily  cefTRIAXone   IVPB 1000 milliGRAM(s) IV Intermittent every 24 hours  digoxin     Tablet 62.5 MICROGram(s) Oral daily  diltiazem    milliGRAM(s) Oral daily  heparin   Injectable 5000 Unit(s) SubCutaneous every 8 hours  influenza  Vaccine (HIGH DOSE) 0.7 milliLiter(s) IntraMuscular once  lactobacillus acidophilus 1 Tablet(s) Oral daily  potassium chloride    Tablet ER 40 milliEquivalent(s) Oral once  potassium chloride    Tablet ER 10 milliEquivalent(s) Oral three times a day    MEDICATIONS  (PRN):  acetaminophen     Tablet .. 650 milliGRAM(s) Oral every 6 hours PRN Temp greater or equal to 38C (100.4F), Mild Pain (1 - 3)  aluminum hydroxide/magnesium hydroxide/simethicone Suspension 30 milliLiter(s) Oral every 4 hours PRN Dyspepsia  melatonin 3 milliGRAM(s) Oral at bedtime PRN Insomnia  ondansetron Injectable 4 milliGRAM(s) IV Push every 8 hours PRN Nausea and/or Vomiting

## 2024-01-29 NOTE — DIETITIAN INITIAL EVALUATION ADULT - ORAL INTAKE PTA/DIET HISTORY
patient seen in bed. poor historian. limited history obtained .  breakfast tray at bedside. patient from SNF no transfer papers seen in chart.   per flow sheets up to 75% of meals taken this admit  education deferred at this time in this patient

## 2024-01-29 NOTE — DIETITIAN INITIAL EVALUATION ADULT - OTHER INFO
Reason for Admission: Fever r/o sepsis  History of Present Illness:   89 YO Female referred from long term care facility because of fever.  Patient with poor cognition.  In the ED she had urinary retention.    weight 130# admit   1/27 BM

## 2024-01-29 NOTE — CARE COORDINATION ASSESSMENT. - NSPASTMEDSURGHISTORY_GEN_ALL_CORE_FT
PAST MEDICAL & SURGICAL HISTORY:  Vertigo      Pacemaker      Hypertension      [place] (hard of hearing)  right ear hearing aid      Colon cancer  s/p resection 04      History of colon resection  2004

## 2024-01-29 NOTE — PROGRESS NOTE ADULT - SUBJECTIVE AND OBJECTIVE BOX
[facility]  INFECTIOUS DISEASES   [address] [place] [address]  Tel: [phone]     Fax: [phone]  ========================================================  MD Zeina Solorzano Kaushal, MD Cho, Michelle, MD Sunjit, Jaspal, MD  ========================================================    [id number]  JEREMIE PUGH     Follow up: UTI    Awake and alert, very pleasant. No fever, no urinary symptoms, no other complaint.     PAST MEDICAL & SURGICAL HISTORY:  Hypertension  Pacemaker  Vertigo  Colon cancer  s/p resection 04  [place] (hard of hearing)  right ear hearing aid  History of colon resection  2004    Social Hx: No current smoking, EtOH or drugs     FAMILY HISTORY:  No pertinent family history in first degree relatives    Allergies  No Known Allergies    MEDICATIONS  (STANDING):  cefTRIAXone   IVPB 1000 milliGRAM(s) IV Intermittent every 24 hours  digoxin     Tablet 62.5 MICROGram(s) Oral daily  diltiazem    milliGRAM(s) Oral daily  furosemide    Tablet 20 milliGRAM(s) Oral daily  heparin   Injectable 5000 Unit(s) SubCutaneous every 12 hours  influenza  Vaccine (HIGH DOSE) 0.7 milliLiter(s) IntraMuscular once  lactobacillus acidophilus 1 Tablet(s) Oral daily    MEDICATIONS  (PRN):  acetaminophen     Tablet .. 650 milliGRAM(s) Oral every 6 hours PRN Temp greater or equal to 38C (100.4F), Mild Pain (1 - 3)  aluminum hydroxide/magnesium hydroxide/simethicone Suspension 30 milliLiter(s) Oral every 4 hours PRN Dyspepsia  melatonin 3 milliGRAM(s) Oral at bedtime PRN Insomnia  ondansetron Injectable 4 milliGRAM(s) IV Push every 8 hours PRN Nausea and/or Vomiting     REVIEW OF SYSTEMS:  CONSTITUTIONAL:  No Fever or chills  HEENT:  No diplopia or blurred vision.  No sore throat or runny nose.  CARDIOVASCULAR:  No chest pain   RESPIRATORY:  No cough, shortness of breath, PND or orthopnea.  GASTROINTESTINAL:  No nausea, vomiting or diarrhea.  GENITOURINARY:  No dysuria, frequency or urgency. No Blood in urine  MUSCULOSKELETAL:  no joint aches, no muscle pain  SKIN:  No change in skin, hair or nails.    Physical Exam:  Vital Signs Last 24 Hrs  T(C): 36.7 (29 Jan 2024 12:05), Max: 36.7 (29 Jan 2024 12:05)  T(F): 98.1 (29 Jan 2024 12:05), Max: 98.1 (29 Jan 2024 12:05)  HR: 74 (29 Jan 2024 12:05) (74 - 99)  BP: 122/65 (29 Jan 2024 12:05) (122/65 - 147/79)  BP(mean): --  RR: 19 (29 Jan 2024 12:05) (18 - 19)  SpO2: 94% (29 Jan 2024 12:05) (93% - 94%)  Parameters below as of 29 Jan 2024 12:05  Patient On (Oxygen Delivery Method): room air  GEN: NAD  HEENT: normocephalic and atraumatic. EOMI. PERRL.    NECK: Supple.  No lymphadenopathy   LUNGS: Clear to auscultation.  HEART: Regular rate and rhythm   ABDOMEN: Soft, nontender, and nondistended.  Positive bowel sounds.    : No CVA tenderness, jones with cloudy urine   EXTREMITIES: Without edema.  NEUROLOGIC: grossly intact.  PSYCHIATRIC: Appropriate affect .  SKIN: No rash     Labs:                        11.9   9.33  )-----------( 342      ( 28 Jan 2024 08:18 )             36.6     01-29    141  |  103  |  10  ----------------------------<  134<H>  3.4<L>   |  29  |  1.10    Ca    9.6      29 Jan 2024 06:06    Culture - Urine (collected 01-26-24 @ 20:09)  Source: Clean Catch Clean Catch (Midstream)  Final Report (01-29-24 @ 13:19):    50,000 - 99,000 CFU/mL Proteus mirabilis    Normal Urogenital kamryn present  Organism: Proteus mirabilis (01-29-24 @ 13:19)  Organism: Proteus mirabilis (01-29-24 @ 13:19)    Sensitivities:      Method Type: ALEC      -  Amoxicillin/Clavulanic Acid: S <=8/4      -  Ampicillin: S <=8 These ampicillin results predict results for amoxicillin      -  Ampicillin/Sulbactam: S <=4/2      -  Aztreonam: S <=4      -  Cefazolin: S <=2 For uncomplicated UTI with K. pneumoniae, E. coli, or P. mirablis: ALEC <=16 is sensitive and ALEC >=32 is resistant. This also predicts results for oral agents cefaclor, cefdinir, cefpodoxime, cefprozil, cefuroxime axetil, cephalexin and locarbef for uncomplicated UTI. Note that some isolates may be susceptible to these agents while testing resistant to cefazolin.      -  Cefepime: S <=2      -  Cefoxitin: S <=8      -  Ceftriaxone: S <=1      -  Cefuroxime: S <=4      -  Ciprofloxacin: S <=0.25      -  Ertapenem: S <=0.5      -  Gentamicin: S <=2      -  Levofloxacin: S <=0.5      -  Meropenem: S <=1      -  Nitrofurantoin: R 64 Should not be used to treat pyelonephritis      -  Piperacillin/Tazobactam: S <=8      -  Tobramycin: S <=2      -  Trimethoprim/Sulfamethoxazole: S <=0.5/9.5    Culture - Blood (collected 01-26-24 @ 16:40)  Source: .Blood Blood-Peripheral  Preliminary Report (01-29-24 @ 01:03):    No growth at 48 Hours    Culture - Blood (collected 01-26-24 @ 16:30)  Source: .Blood Blood-Peripheral  Preliminary Report (01-29-24 @ 01:03):    No growth at 48 Hours    Culture - Blood (collected 03-29-23 @ 21:20)  Source: .Blood  Final Report (04-04-23 @ 01:01):    No Growth Final    Culture - Blood (collected 03-29-23 @ 21:05)  Source: .Blood  Final Report (04-04-23 @ 01:01):    No Growth Final    WBC Count: 9.33 K/uL (01-28-24 @ 08:18)  WBC Count: 11.72 K/uL (01-27-24 @ 08:10)  WBC Count: 18.31 K/uL (01-26-24 @ 16:40)    Creatinine: 1.10 mg/dL (01-29-24 @ 06:06)  Creatinine: 1.20 mg/dL (01-28-24 @ 08:18)  Creatinine: 1.20 mg/dL (01-27-24 @ 08:10)  Creatinine: 1.40 mg/dL (01-26-24 @ 16:40)    Procalcitonin, Serum: 0.42 ng/mL (01-26-24 @ 23:47)     SARS-CoV-2 Result: NotDetec (01-26-24 @ 18:03)  SARS-CoV-2: NotDetec (01-26-24 @ 18:03)    All imaging and other data have been reviewed.  < from: CT Abdomen and Pelvis w/ IV Cont (01.26.24 @ 18:48) >  IMPRESSION:  No pulmonary emboli visualized.  Mild focal outpouching of the proximal   pulmonary artery to the left suggesting a small saccular aneurysm. This   is of uncertain etiology. Rare contained rupture of an artery cannot   entirely be excluded. Clinical correlation is suggested.  Bilateral moderate hydroureteronephrosis and distended bladder. Bladder   outlet obstruction must be excluded. Question of bladder wall thickening.   Cystitis cannot be excluded.  The anterior wall of the transverse colon is extending through the   umbilical hernia without gross obstruction.  Cardiomegaly.    Assessment and Plan:   89yo woman with PMH of HTN, CHF, s/p PPM and colon ca s/p resection in 2004 was admitted from a long care facility with fever and chills.   When I saw her this morning, she didn't have any complaint. Her Great grand niece was at the bedside stating that she is [place] but functional and ambulatroy.   She didn't have any complaint this morning.   She had urinary retention in ED for which had Jones with a cloudy urine, as per niece she didn't have catheter in the past.    UA showed mildly elevated WBC=18 and negative nitrate in urine  Leukocytosis of 18k in CBC on admission.   Procalcitonin 0.42    # Urinary retention   # UTI    - Blood cultures NGTD  - UC with Proteus 50-99,000 Sensitive to most antibiotcs   - Continue ceftriaxone 1gm daily  - Jones has been removed today will monitor   - EBC 18-->9 improved   - If successful voiding trial, can switch to oral ABx.     Will follow.    Howard Wahl MD  Division of Infectious Diseases   Please call ID service at [phone] with any question.    50 minutes spent on total encounter assessing patient, examination, chart review, counseling and coordinating care by the attending physician/nurse/care manager.

## 2024-01-30 ENCOUNTER — TRANSCRIPTION ENCOUNTER (OUTPATIENT)
Age: 89
End: 2024-01-30

## 2024-01-30 VITALS
OXYGEN SATURATION: 92 % | HEART RATE: 75 BPM | DIASTOLIC BLOOD PRESSURE: 81 MMHG | TEMPERATURE: 98 F | RESPIRATION RATE: 18 BRPM | SYSTOLIC BLOOD PRESSURE: 125 MMHG

## 2024-01-30 LAB
ANION GAP SERPL CALC-SCNC: 5 MMOL/L — SIGNIFICANT CHANGE UP (ref 5–17)
BUN SERPL-MCNC: 12 MG/DL — SIGNIFICANT CHANGE UP (ref 7–23)
CALCIUM SERPL-MCNC: 9.2 MG/DL — SIGNIFICANT CHANGE UP (ref 8.5–10.1)
CHLORIDE SERPL-SCNC: 107 MMOL/L — SIGNIFICANT CHANGE UP (ref 96–108)
CO2 SERPL-SCNC: 27 MMOL/L — SIGNIFICANT CHANGE UP (ref 22–31)
CREAT SERPL-MCNC: 1.2 MG/DL — SIGNIFICANT CHANGE UP (ref 0.5–1.3)
EGFR: 40 ML/MIN/1.73M2 — LOW
GLUCOSE SERPL-MCNC: 115 MG/DL — HIGH (ref 70–99)
MAGNESIUM SERPL-MCNC: 1.9 MG/DL — SIGNIFICANT CHANGE UP (ref 1.6–2.6)
POTASSIUM SERPL-MCNC: 4.5 MMOL/L — SIGNIFICANT CHANGE UP (ref 3.5–5.3)
POTASSIUM SERPL-SCNC: 4.5 MMOL/L — SIGNIFICANT CHANGE UP (ref 3.5–5.3)
SODIUM SERPL-SCNC: 139 MMOL/L — SIGNIFICANT CHANGE UP (ref 135–145)

## 2024-01-30 PROCEDURE — 87637 SARSCOV2&INF A&B&RSV AMP PRB: CPT

## 2024-01-30 PROCEDURE — 84484 ASSAY OF TROPONIN QUANT: CPT

## 2024-01-30 PROCEDURE — 80162 ASSAY OF DIGOXIN TOTAL: CPT

## 2024-01-30 PROCEDURE — 97530 THERAPEUTIC ACTIVITIES: CPT

## 2024-01-30 PROCEDURE — 99285 EMERGENCY DEPT VISIT HI MDM: CPT

## 2024-01-30 PROCEDURE — 0225U NFCT DS DNA&RNA 21 SARSCOV2: CPT

## 2024-01-30 PROCEDURE — 97116 GAIT TRAINING THERAPY: CPT

## 2024-01-30 PROCEDURE — 81001 URINALYSIS AUTO W/SCOPE: CPT

## 2024-01-30 PROCEDURE — 96374 THER/PROPH/DIAG INJ IV PUSH: CPT

## 2024-01-30 PROCEDURE — 71045 X-RAY EXAM CHEST 1 VIEW: CPT

## 2024-01-30 PROCEDURE — 87086 URINE CULTURE/COLONY COUNT: CPT

## 2024-01-30 PROCEDURE — 93005 ELECTROCARDIOGRAM TRACING: CPT

## 2024-01-30 PROCEDURE — 99232 SBSQ HOSP IP/OBS MODERATE 35: CPT

## 2024-01-30 PROCEDURE — 83036 HEMOGLOBIN GLYCOSYLATED A1C: CPT

## 2024-01-30 PROCEDURE — 83735 ASSAY OF MAGNESIUM: CPT

## 2024-01-30 PROCEDURE — 83690 ASSAY OF LIPASE: CPT

## 2024-01-30 PROCEDURE — 84145 PROCALCITONIN (PCT): CPT

## 2024-01-30 PROCEDURE — 93306 TTE W/DOPPLER COMPLETE: CPT

## 2024-01-30 PROCEDURE — 87077 CULTURE AEROBIC IDENTIFY: CPT

## 2024-01-30 PROCEDURE — 83880 ASSAY OF NATRIURETIC PEPTIDE: CPT

## 2024-01-30 PROCEDURE — 80048 BASIC METABOLIC PNL TOTAL CA: CPT

## 2024-01-30 PROCEDURE — 74177 CT ABD & PELVIS W/CONTRAST: CPT | Mod: QQ

## 2024-01-30 PROCEDURE — 70450 CT HEAD/BRAIN W/O DYE: CPT | Mod: QQ

## 2024-01-30 PROCEDURE — 87040 BLOOD CULTURE FOR BACTERIA: CPT

## 2024-01-30 PROCEDURE — 97165 OT EVAL LOW COMPLEX 30 MIN: CPT

## 2024-01-30 PROCEDURE — 87186 SC STD MICRODIL/AGAR DIL: CPT

## 2024-01-30 PROCEDURE — 85025 COMPLETE CBC W/AUTO DIFF WBC: CPT

## 2024-01-30 PROCEDURE — 71275 CT ANGIOGRAPHY CHEST: CPT | Mod: QQ

## 2024-01-30 PROCEDURE — 84443 ASSAY THYROID STIM HORMONE: CPT

## 2024-01-30 PROCEDURE — 83605 ASSAY OF LACTIC ACID: CPT

## 2024-01-30 PROCEDURE — 85610 PROTHROMBIN TIME: CPT

## 2024-01-30 PROCEDURE — 80053 COMPREHEN METABOLIC PANEL: CPT

## 2024-01-30 PROCEDURE — 36415 COLL VENOUS BLD VENIPUNCTURE: CPT

## 2024-01-30 PROCEDURE — 82962 GLUCOSE BLOOD TEST: CPT

## 2024-01-30 PROCEDURE — 85027 COMPLETE CBC AUTOMATED: CPT

## 2024-01-30 PROCEDURE — 85730 THROMBOPLASTIN TIME PARTIAL: CPT

## 2024-01-30 PROCEDURE — 97162 PT EVAL MOD COMPLEX 30 MIN: CPT

## 2024-01-30 RX ORDER — CEFUROXIME AXETIL 250 MG
1 TABLET ORAL
Qty: 8 | Refills: 0
Start: 2024-01-30 | End: 2024-02-02

## 2024-01-30 RX ADMIN — Medication 10 MILLIEQUIVALENT(S): at 06:27

## 2024-01-30 RX ADMIN — HEPARIN SODIUM 5000 UNIT(S): 5000 INJECTION INTRAVENOUS; SUBCUTANEOUS at 06:26

## 2024-01-30 RX ADMIN — Medication 62.5 MICROGRAM(S): at 06:27

## 2024-01-30 RX ADMIN — Medication 180 MILLIGRAM(S): at 06:27

## 2024-01-30 RX ADMIN — CEFTRIAXONE 100 MILLIGRAM(S): 500 INJECTION, POWDER, FOR SOLUTION INTRAMUSCULAR; INTRAVENOUS at 06:26

## 2024-01-30 RX ADMIN — Medication 1 TABLET(S): at 11:34

## 2024-01-30 RX ADMIN — Medication 81 MILLIGRAM(S): at 11:34

## 2024-01-30 NOTE — DISCHARGE NOTE NURSING/CASE MANAGEMENT/SOCIAL WORK - NSDCCRTYPESERV_GEN_ALL_CORE_FT
Pt had home physical therapy 2 days /week prior to admission, niece would like to continue with current agency, not able to provide name of agency

## 2024-01-30 NOTE — PROGRESS NOTE ADULT - PROVIDER SPECIALTY LIST ADULT
Infectious Disease
Cardiology
Nephrology
Nephrology
Infectious Disease
Infectious Disease
Internal Medicine
Family Medicine
Internal Medicine
Family Medicine

## 2024-01-30 NOTE — DISCHARGE NOTE PROVIDER - HOSPITAL COURSE
Jenna Carrera is a 89 YO Female referred from long term care facility because of fever.  Patient with poor cognition.  In the ED she had urinary retention.    Patient treated for UTI with iv abx  Improved  TOV    >35 minutes spent on discharge Jenna Carrera is a 89 YO Female came with fever.  Patient with poor cognition.  In the ED she had urinary retention.    Patient treated for UTI with iv abx  Improved  Patient voiding well    >35 minutes spent on discharge

## 2024-01-30 NOTE — DISCHARGE NOTE PROVIDER - NSDCCPCAREPLAN_GEN_ALL_CORE_FT
PRINCIPAL DISCHARGE DIAGNOSIS  Diagnosis: Urinary tract infection  Assessment and Plan of Treatment: Finish course of antibiotics.        SECONDARY DISCHARGE DIAGNOSES  Diagnosis: Urinary retention  Assessment and Plan of Treatment:      PRINCIPAL DISCHARGE DIAGNOSIS  Diagnosis: Urinary tract infection  Assessment and Plan of Treatment: Finish course of antibiotics.        SECONDARY DISCHARGE DIAGNOSES  Diagnosis: Urinary retention  Assessment and Plan of Treatment: resolved

## 2024-01-30 NOTE — PROGRESS NOTE ADULT - PROBLEM SELECTOR PLAN 3
continue home meds
continue cardizem for rate control

## 2024-01-30 NOTE — PROGRESS NOTE ADULT - PROBLEM SELECTOR PROBLEM 3
Permanent atrial fibrillation

## 2024-01-30 NOTE — CAREGIVER ENGAGEMENT NOTE - CAREGIVER OUTREACH NOTES - FREE TEXT
Brandie assisted with CM assessment and requesting to be made aware of all DC plans. 
Discussed that per MD, pt is stable for transition home today. She confirmed that Aide will be in place today. CM contacted  [facility], [facility], spoke with Ganesh who confirmed that Aide will be in place at 2pm today. AMMON also spoke with Antoni at [facility] who also confirmed that Aide will be in place today at 2pm. Discussed that patient was recommended for home PT, Brandie stated that patient already receives Home Physical therapy 2 days/week, unable to provide name of agency, and she would like to continue with them. She stated that she does not need a script to resume PT. However Script provided by MD, in case  it is needed. Discharge notice (IMM) reviewed via telephone, Brandie is in agreement with her aunt transitioning to home today. She stated that her friend Eh will be transporting her home, and she will contact Eh to notify him of the time. D/C summary to be faxed to above agencies as requested.

## 2024-01-30 NOTE — PROGRESS NOTE ADULT - REASON FOR ADMISSION
Fever r/o sepsis

## 2024-01-30 NOTE — PROGRESS NOTE ADULT - SUBJECTIVE AND OBJECTIVE BOX
[facility]  INFECTIOUS DISEASES   [address] [place] [address]  Tel: [phone]     Fax: [phone]  ========================================================  MD Zeina Solorzano Kaushal, MD Cho, Michelle, MD Sunjit, Jaspal, MD  ========================================================    [id number]  JEREMIE PUGH     Follow up: UTI    Awake and alert, very pleasant. No fever, no urinary symptoms, no other complaint.   Successful TOV after removing jones yesterday.     PAST MEDICAL & SURGICAL HISTORY:  Hypertension  Pacemaker  Vertigo  Colon cancer  s/p resection 04  [place] (hard of hearing)  right ear hearing aid  History of colon resection  2004    Social Hx: No current smoking, EtOH or drugs     FAMILY HISTORY:  No pertinent family history in first degree relatives    Allergies  No Known Allergies    MEDICATIONS  (STANDING):  cefTRIAXone   IVPB 1000 milliGRAM(s) IV Intermittent every 24 hours  digoxin     Tablet 62.5 MICROGram(s) Oral daily  diltiazem    milliGRAM(s) Oral daily  furosemide    Tablet 20 milliGRAM(s) Oral daily  heparin   Injectable 5000 Unit(s) SubCutaneous every 12 hours  influenza  Vaccine (HIGH DOSE) 0.7 milliLiter(s) IntraMuscular once  lactobacillus acidophilus 1 Tablet(s) Oral daily    MEDICATIONS  (PRN):  acetaminophen     Tablet .. 650 milliGRAM(s) Oral every 6 hours PRN Temp greater or equal to 38C (100.4F), Mild Pain (1 - 3)  aluminum hydroxide/magnesium hydroxide/simethicone Suspension 30 milliLiter(s) Oral every 4 hours PRN Dyspepsia  melatonin 3 milliGRAM(s) Oral at bedtime PRN Insomnia  ondansetron Injectable 4 milliGRAM(s) IV Push every 8 hours PRN Nausea and/or Vomiting     REVIEW OF SYSTEMS:  CONSTITUTIONAL:  No Fever or chills  HEENT:  No diplopia or blurred vision.  No sore throat or runny nose.  CARDIOVASCULAR:  No chest pain   RESPIRATORY:  No cough, shortness of breath, PND or orthopnea.  GASTROINTESTINAL:  No nausea, vomiting or diarrhea.  GENITOURINARY:  No dysuria, frequency or urgency. No Blood in urine  MUSCULOSKELETAL:  no joint aches, no muscle pain  SKIN:  No change in skin, hair or nails.    Physical Exam:  Vital Signs Last 24 Hrs  T(C): 36.8 (30 Jan 2024 04:09), Max: 36.8 (30 Jan 2024 04:09)  T(F): 98.2 (30 Jan 2024 04:09), Max: 98.2 (30 Jan 2024 04:09)  HR: 80 (30 Jan 2024 04:09) (80 - 89)  BP: 131/71 (30 Jan 2024 04:09) (125/79 - 131/71)  RR: 19 (30 Jan 2024 04:09) (19 - 19)  SpO2: 94% (30 Jan 2024 04:09) (94% - 94%)  Parameters below as of 30 Jan 2024 04:09  Patient On (Oxygen Delivery Method): room air  GEN: NAD  HEENT: normocephalic and atraumatic. EOMI. PERRL.    NECK: Supple.  No lymphadenopathy   LUNGS: Clear to auscultation.  HEART: Regular rate and rhythm   ABDOMEN: Soft, nontender, and nondistended.  Positive bowel sounds.    : No CVA tenderness, jones with cloudy urine   EXTREMITIES: Without edema.  NEUROLOGIC: grossly intact.  PSYCHIATRIC: Appropriate affect .  SKIN: No rash     Labs:    01-30    139  |  107  |  12  ----------------------------<  115<H>  4.5   |  27  |  1.20    Ca    9.2      30 Jan 2024 07:36  Mg     1.9     01-30    Culture - Urine (collected 01-26-24 @ 20:09)  Source: Clean Catch Clean Catch (Midstream)  Final Report (01-29-24 @ 13:19):    50,000 - 99,000 CFU/mL Proteus mirabilis    Normal Urogenital kamryn present  Organism: Proteus mirabilis (01-29-24 @ 13:19)  Organism: Proteus mirabilis (01-29-24 @ 13:19)    Sensitivities:      Method Type: ALEC      -  Amoxicillin/Clavulanic Acid: S <=8/4      -  Ampicillin: S <=8 These ampicillin results predict results for amoxicillin      -  Ampicillin/Sulbactam: S <=4/2      -  Aztreonam: S <=4      -  Cefazolin: S <=2 For uncomplicated UTI with K. pneumoniae, E. coli, or P. mirablis: ALEC <=16 is sensitive and ALEC >=32 is resistant. This also predicts results for oral agents cefaclor, cefdinir, cefpodoxime, cefprozil, cefuroxime axetil, cephalexin and locarbef for uncomplicated UTI. Note that some isolates may be susceptible to these agents while testing resistant to cefazolin.      -  Cefepime: S <=2      -  Cefoxitin: S <=8      -  Ceftriaxone: S <=1      -  Cefuroxime: S <=4      -  Ciprofloxacin: S <=0.25      -  Ertapenem: S <=0.5      -  Gentamicin: S <=2      -  Levofloxacin: S <=0.5      -  Meropenem: S <=1      -  Nitrofurantoin: R 64 Should not be used to treat pyelonephritis      -  Piperacillin/Tazobactam: S <=8      -  Tobramycin: S <=2      -  Trimethoprim/Sulfamethoxazole: S <=0.5/9.5    Culture - Blood (collected 01-26-24 @ 16:40)  Source: .Blood Blood-Peripheral  Preliminary Report (01-30-24 @ 01:02):    No growth at 72 Hours    Culture - Blood (collected 01-26-24 @ 16:30)  Source: .Blood Blood-Peripheral  Preliminary Report (01-30-24 @ 01:02):    No growth at 72 Hours    Culture - Blood (collected 03-29-23 @ 21:20)  Source: .Blood  Final Report (04-04-23 @ 01:01):    No Growth Final    Culture - Blood (collected 03-29-23 @ 21:05)  Source: .Blood  Final Report (04-04-23 @ 01:01):    No Growth Final    WBC Count: 9.33 K/uL (01-28-24 @ 08:18)  WBC Count: 11.72 K/uL (01-27-24 @ 08:10)  WBC Count: 18.31 K/uL (01-26-24 @ 16:40)    Creatinine: 1.20 mg/dL (01-30-24 @ 07:36)  Creatinine: 1.10 mg/dL (01-29-24 @ 06:06)  Creatinine: 1.20 mg/dL (01-28-24 @ 08:18)  Creatinine: 1.20 mg/dL (01-27-24 @ 08:10)  Creatinine: 1.40 mg/dL (01-26-24 @ 16:40)    Procalcitonin, Serum: 0.42 ng/mL (01-26-24 @ 23:47)     SARS-CoV-2 Result: NotDetec (01-26-24 @ 18:03)  SARS-CoV-2: NotDetec (01-26-24 @ 18:03)    All imaging and other data have been reviewed.  < from: CT Abdomen and Pelvis w/ IV Cont (01.26.24 @ 18:48) >  IMPRESSION:  No pulmonary emboli visualized.  Mild focal outpouching of the proximal   pulmonary artery to the left suggesting a small saccular aneurysm. This   is of uncertain etiology. Rare contained rupture of an artery cannot   entirely be excluded. Clinical correlation is suggested.  Bilateral moderate hydroureteronephrosis and distended bladder. Bladder   outlet obstruction must be excluded. Question of bladder wall thickening.   Cystitis cannot be excluded.  The anterior wall of the transverse colon is extending through the   umbilical hernia without gross obstruction.  Cardiomegaly.    Assessment and Plan:   89yo woman with PMH of HTN, CHF, s/p PPM and colon ca s/p resection in 2004 was admitted from a long care facility with fever and chills.   When I saw her this morning, she didn't have any complaint. Her Great grand niece was at the bedside stating that she is [place] but functional and ambulatroy.   She didn't have any complaint this morning.   She had urinary retention in ED for which had Jones with a cloudy urine, as per niece she didn't have catheter in the past.    UA showed mildly elevated WBC=18 and negative nitrate in urine  Leukocytosis of 18k in CBC on admission.   Procalcitonin 0.42    # Urinary retention   # UTI    - Blood cultures NGTD  - UC with Proteus 50-99,000 Sensitive to most antibiotics   - On ceftriaxone 1gm daily  - Jones has been removed yesterday   - EBC 18-->9 improved   - Can switch to ceftin 250mg q12 to complete total 7days from start of ceftriaxone     Will sign off please call with any question.     Howard Wahl MD  Division of Infectious Diseases   Please call ID service at [phone] with any question.    50 minutes spent on total encounter assessing patient, examination, chart review, counseling and coordinating care by the attending physician/nurse/care manager.

## 2024-01-30 NOTE — DISCHARGE NOTE PROVIDER - CARE PROVIDER_API CALL
Micheal Anaya  Internal Medicine  [address], [address]  [place] [address]  Phone: [phone]  Fax: [phone]  Established Patient  Follow Up Time: 1 week

## 2024-01-30 NOTE — DISCHARGE NOTE NURSING/CASE MANAGEMENT/SOCIAL WORK - PATIENT PORTAL LINK FT
You can access the FollowMyHealth Patient Portal offered by [facility] by registering at the following website: [email or web address]. By joining [facility]’s FollowMyHealth portal, you will also be able to view your health information using other applications (apps) compatible with our system.

## 2024-01-30 NOTE — PROGRESS NOTE ADULT - SUBJECTIVE AND OBJECTIVE BOX
Date of Service: 01-30-24 @ 12:22    Patient is a 89y old  Female who presents with a chief complaint of Fever r/o sepsis (30 Jan 2024 08:17)      INTERVAL HPI/OVERNIGHT EVENTS: Patient seen and examined. NAD. No complaints.    Vital Signs Last 24 Hrs  T(C): 36.8 (30 Jan 2024 04:09), Max: 36.8 (30 Jan 2024 04:09)  T(F): 98.2 (30 Jan 2024 04:09), Max: 98.2 (30 Jan 2024 04:09)  HR: 80 (30 Jan 2024 04:09) (80 - 89)  BP: 131/71 (30 Jan 2024 04:09) (125/79 - 131/71)  BP(mean): --  RR: 19 (30 Jan 2024 04:09) (19 - 19)  SpO2: 94% (30 Jan 2024 04:09) (94% - 94%)    Parameters below as of 30 Jan 2024 04:09  Patient On (Oxygen Delivery Method): room air        01-30    139  |  107  |  12  ----------------------------<  115<H>  4.5   |  27  |  1.20    Ca    9.2      30 Jan 2024 07:36  Mg     1.9     01-30          CAPILLARY BLOOD GLUCOSE        Urinalysis Basic - ( 30 Jan 2024 07:36 )    Color: x / Appearance: x / SG: x / pH: x  Gluc: 115 mg/dL / Ketone: x  / Bili: x / Urobili: x   Blood: x / Protein: x / Nitrite: x   Leuk Esterase: x / RBC: x / WBC x   Sq Epi: x / Non Sq Epi: x / Bacteria: x    Culture - Urine (01.26.24 @ 20:09)   - Amoxicillin/Clavulanic Acid: S <=8/4  - Ampicillin: S <=8 These ampicillin results predict results for amoxicillin  - Ampicillin/Sulbactam: S <=4/2  - Aztreonam: S <=4  - Cefazolin: S <=2 For uncomplicated UTI with K. pneumoniae, E. coli, or P. mirablis: ALEC <=16 is sensitive and ALEC >=32 is resistant. This also predicts results for oral agents cefaclor, cefdinir, cefpodoxime, cefprozil, cefuroxime axetil, cephalexin and locarbef for uncomplicated UTI. Note that some isolates may be susceptible to these agents while testing resistant to cefazolin.  - Cefepime: S <=2  - Cefoxitin: S <=8  - Ceftriaxone: S <=1  - Cefuroxime: S <=4  - Ciprofloxacin: S <=0.25  - Ertapenem: S <=0.5  - Gentamicin: S <=2  - Levofloxacin: S <=0.5  - Meropenem: S <=1  - Nitrofurantoin: R 64 Should not be used to treat pyelonephritis  - Piperacillin/Tazobactam: S <=8  - Tobramycin: S <=2  - Trimethoprim/Sulfamethoxazole: S <=0.5/9.5  Specimen Source: Clean Catch Clean Catch (Midstream)  Culture Results:   50,000 - 99,000 CFU/mL Proteus mirabilis   Normal Urogenital kamryn present  Organism Identification: Proteus mirabilis  Organism: Proteus mirabilis  Method Type: ALEC          acetaminophen     Tablet .. 650 milliGRAM(s) Oral every 6 hours PRN  aluminum hydroxide/magnesium hydroxide/simethicone Suspension 30 milliLiter(s) Oral every 4 hours PRN  aspirin enteric coated 81 milliGRAM(s) Oral daily  cefTRIAXone   IVPB 1000 milliGRAM(s) IV Intermittent every 24 hours  digoxin     Tablet 62.5 MICROGram(s) Oral daily  diltiazem    milliGRAM(s) Oral daily  heparin   Injectable 5000 Unit(s) SubCutaneous every 8 hours  influenza  Vaccine (HIGH DOSE) 0.7 milliLiter(s) IntraMuscular once  lactobacillus acidophilus 1 Tablet(s) Oral daily  melatonin 3 milliGRAM(s) Oral at bedtime PRN  ondansetron Injectable 4 milliGRAM(s) IV Push every 8 hours PRN  potassium chloride    Tablet ER 10 milliEquivalent(s) Oral three times a day              REVIEW OF SYSTEMS:  CONSTITUTIONAL: No fever, no weight loss, or no fatigue  NECK: No pain, no stiffness  RESPIRATORY: No cough, no wheezing, no chills, no hemoptysis, No shortness of breath  CARDIOVASCULAR: No chest pain, no palpitations, no dizziness, no leg swelling  GASTROINTESTINAL: No abdominal pain. No nausea, no vomiting, no hematemesis; No diarrhea, no constipation. No melena, no hematochezia.  GENITOURINARY: No dysuria, no frequency, no hematuria, no incontinence  NEUROLOGICAL: No headaches, no loss of strength, no numbness, no tremors  SKIN: No itching, no burning  MUSCULOSKELETAL: No joint pain, no swelling; No muscle, no back, no extremity pain  PSYCHIATRIC: No depression, no mood swings,   HEME/LYMPH: No easy bruising, no bleeding gums  ALLERY AND IMMUNOLOGIC: No hives       Consultant(s) Notes Reviewed:  [X] YES  [ ] NO    PHYSICAL EXAM:  GENERAL: NAD  HEAD:  Atraumatic, Normocephalic  EYES: EOMI, PERRLA, conjunctiva and sclera clear  ENMT: No tonsillar erythema, exudates, or enlargement; Moist mucous membranes  NECK: Supple, No JVD  NERVOUS SYSTEM:  Awake & alert  CHEST/LUNG: Clear to auscultation bilaterally; No rales, rhonchi, wheezing,  HEART: Regular rate and rhythm  ABDOMEN: Soft, Nontender, Nondistended; Bowel sounds present  EXTREMITIES:  No clubbing, cyanosis, or edema  LYMPH: No lymphadenopathy noted  SKIN: No rashes      Advanced care planning discussed with patient/family [X] YES   [ ] NO    Advanced care planning discussed with patient/family. Patient's health status was discussed. All appropriate changes have been made regarding patient's end-of-life care. Advanced care planning forms reviewed/discussed/completed.  20 minutes spent.

## 2024-01-30 NOTE — DISCHARGE NOTE PROVIDER - NSDCMRMEDTOKEN_GEN_ALL_CORE_FT
amoxicillin-clavulanate 875 mg-125 mg oral tablet: 1 tab(s) orally 2 times a day  digoxin 125 mcg (0.125 mg) oral tablet: 1 tab(s) orally once a day  diltiazem 24 hour extended release: 180  orally once a day  furosemide 20 mg oral tablet: 1 tab(s) orally once a day   cefuroxime 250 mg oral tablet: 1 tab(s) orally 2 times a day  digoxin 125 mcg (0.125 mg) oral tablet: 1 tab(s) orally once a day  diltiazem 24 hour extended release: 180  orally once a day  furosemide 20 mg oral tablet: 1 tab(s) orally once a day

## 2024-03-29 ENCOUNTER — INPATIENT (INPATIENT)
Facility: HOSPITAL | Age: 89
LOS: 3 days | Discharge: NURSING, REHAB OR LONG-TERM CARE | DRG: 871 | End: 2024-04-02
Attending: INTERNAL MEDICINE | Admitting: INTERNAL MEDICINE
Payer: MEDICARE

## 2024-03-29 VITALS
TEMPERATURE: 100 F | OXYGEN SATURATION: 94 % | WEIGHT: 130.07 LBS | DIASTOLIC BLOOD PRESSURE: 59 MMHG | RESPIRATION RATE: 18 BRPM | SYSTOLIC BLOOD PRESSURE: 129 MMHG | HEIGHT: 59 IN | HEART RATE: 83 BPM

## 2024-03-29 DIAGNOSIS — Z90.49 ACQUIRED ABSENCE OF OTHER SPECIFIED PARTS OF DIGESTIVE TRACT: Chronic | ICD-10-CM

## 2024-03-29 LAB
ALBUMIN SERPL ELPH-MCNC: 3.2 G/DL — LOW (ref 3.3–5)
ALP SERPL-CCNC: 91 U/L — SIGNIFICANT CHANGE UP (ref 40–120)
ALT FLD-CCNC: 18 U/L — SIGNIFICANT CHANGE UP (ref 12–78)
ANION GAP SERPL CALC-SCNC: 6 MMOL/L — SIGNIFICANT CHANGE UP (ref 5–17)
APTT BLD: 28.8 SEC — SIGNIFICANT CHANGE UP (ref 24.5–35.6)
AST SERPL-CCNC: 14 U/L — LOW (ref 15–37)
BASOPHILS # BLD AUTO: 0.06 K/UL — SIGNIFICANT CHANGE UP (ref 0–0.2)
BASOPHILS NFR BLD AUTO: 0.4 % — SIGNIFICANT CHANGE UP (ref 0–2)
BILIRUB SERPL-MCNC: 0.7 MG/DL — SIGNIFICANT CHANGE UP (ref 0.2–1.2)
BUN SERPL-MCNC: 28 MG/DL — HIGH (ref 7–23)
CALCIUM SERPL-MCNC: 9.7 MG/DL — SIGNIFICANT CHANGE UP (ref 8.5–10.1)
CHLORIDE SERPL-SCNC: 101 MMOL/L — SIGNIFICANT CHANGE UP (ref 96–108)
CO2 SERPL-SCNC: 29 MMOL/L — SIGNIFICANT CHANGE UP (ref 22–31)
CREAT SERPL-MCNC: 1.2 MG/DL — SIGNIFICANT CHANGE UP (ref 0.5–1.3)
EGFR: 40 ML/MIN/1.73M2 — LOW
EOSINOPHIL # BLD AUTO: 0.11 K/UL — SIGNIFICANT CHANGE UP (ref 0–0.5)
EOSINOPHIL NFR BLD AUTO: 0.8 % — SIGNIFICANT CHANGE UP (ref 0–6)
GLUCOSE SERPL-MCNC: 141 MG/DL — HIGH (ref 70–99)
HCT VFR BLD CALC: 40.9 % — SIGNIFICANT CHANGE UP (ref 34.5–45)
HGB BLD-MCNC: 13.3 G/DL — SIGNIFICANT CHANGE UP (ref 11.5–15.5)
IMM GRANULOCYTES NFR BLD AUTO: 0.5 % — SIGNIFICANT CHANGE UP (ref 0–0.9)
INR BLD: 1.03 RATIO — SIGNIFICANT CHANGE UP (ref 0.85–1.18)
LACTATE SERPL-SCNC: 1.4 MMOL/L — SIGNIFICANT CHANGE UP (ref 0.7–2)
LYMPHOCYTES # BLD AUTO: 0.66 K/UL — LOW (ref 1–3.3)
LYMPHOCYTES # BLD AUTO: 4.5 % — LOW (ref 13–44)
MCHC RBC-ENTMCNC: 29.5 PG — SIGNIFICANT CHANGE UP (ref 27–34)
MCHC RBC-ENTMCNC: 32.5 GM/DL — SIGNIFICANT CHANGE UP (ref 32–36)
MCV RBC AUTO: 90.7 FL — SIGNIFICANT CHANGE UP (ref 80–100)
MONOCYTES # BLD AUTO: 1.27 K/UL — HIGH (ref 0–0.9)
MONOCYTES NFR BLD AUTO: 8.7 % — SIGNIFICANT CHANGE UP (ref 2–14)
NEUTROPHILS # BLD AUTO: 12.43 K/UL — HIGH (ref 1.8–7.4)
NEUTROPHILS NFR BLD AUTO: 85.1 % — HIGH (ref 43–77)
NRBC # BLD: 0 /100 WBCS — SIGNIFICANT CHANGE UP (ref 0–0)
PLATELET # BLD AUTO: 243 K/UL — SIGNIFICANT CHANGE UP (ref 150–400)
POTASSIUM SERPL-MCNC: 3.8 MMOL/L — SIGNIFICANT CHANGE UP (ref 3.5–5.3)
POTASSIUM SERPL-SCNC: 3.8 MMOL/L — SIGNIFICANT CHANGE UP (ref 3.5–5.3)
PROT SERPL-MCNC: 7.8 G/DL — SIGNIFICANT CHANGE UP (ref 6–8.3)
PROTHROM AB SERPL-ACNC: 12 SEC — SIGNIFICANT CHANGE UP (ref 9.5–13)
RBC # BLD: 4.51 M/UL — SIGNIFICANT CHANGE UP (ref 3.8–5.2)
RBC # FLD: 13.2 % — SIGNIFICANT CHANGE UP (ref 10.3–14.5)
SODIUM SERPL-SCNC: 136 MMOL/L — SIGNIFICANT CHANGE UP (ref 135–145)
WBC # BLD: 14.61 K/UL — HIGH (ref 3.8–10.5)
WBC # FLD AUTO: 14.61 K/UL — HIGH (ref 3.8–10.5)

## 2024-03-29 PROCEDURE — 74176 CT ABD & PELVIS W/O CONTRAST: CPT | Mod: 26,MC

## 2024-03-29 PROCEDURE — 93010 ELECTROCARDIOGRAM REPORT: CPT

## 2024-03-29 PROCEDURE — 99285 EMERGENCY DEPT VISIT HI MDM: CPT

## 2024-03-29 RX ORDER — SODIUM CHLORIDE 9 MG/ML
1000 INJECTION INTRAMUSCULAR; INTRAVENOUS; SUBCUTANEOUS ONCE
Refills: 0 | Status: COMPLETED | OUTPATIENT
Start: 2024-03-29 | End: 2024-03-29

## 2024-03-29 RX ORDER — DIGOXIN 250 MCG
125 TABLET ORAL DAILY
Refills: 0 | Status: DISCONTINUED | OUTPATIENT
Start: 2024-03-29 | End: 2024-03-30

## 2024-03-29 RX ORDER — ONDANSETRON 8 MG/1
4 TABLET, FILM COATED ORAL EVERY 6 HOURS
Refills: 0 | Status: DISCONTINUED | OUTPATIENT
Start: 2024-03-29 | End: 2024-04-02

## 2024-03-29 RX ORDER — SODIUM CHLORIDE 9 MG/ML
1000 INJECTION INTRAMUSCULAR; INTRAVENOUS; SUBCUTANEOUS
Refills: 0 | Status: DISCONTINUED | OUTPATIENT
Start: 2024-03-29 | End: 2024-04-02

## 2024-03-29 RX ORDER — CEFTRIAXONE 500 MG/1
1000 INJECTION, POWDER, FOR SOLUTION INTRAMUSCULAR; INTRAVENOUS ONCE
Refills: 0 | Status: COMPLETED | OUTPATIENT
Start: 2024-03-29 | End: 2024-03-29

## 2024-03-29 RX ORDER — ENOXAPARIN SODIUM 100 MG/ML
30 INJECTION SUBCUTANEOUS EVERY 24 HOURS
Refills: 0 | Status: DISCONTINUED | OUTPATIENT
Start: 2024-03-29 | End: 2024-03-30

## 2024-03-29 RX ORDER — DILTIAZEM HCL 120 MG
180 CAPSULE, EXT RELEASE 24 HR ORAL DAILY
Refills: 0 | Status: DISCONTINUED | OUTPATIENT
Start: 2024-03-29 | End: 2024-04-02

## 2024-03-29 RX ORDER — ACETAMINOPHEN 500 MG
650 TABLET ORAL EVERY 6 HOURS
Refills: 0 | Status: DISCONTINUED | OUTPATIENT
Start: 2024-03-29 | End: 2024-04-02

## 2024-03-29 RX ORDER — ACETAMINOPHEN 500 MG
1000 TABLET ORAL ONCE
Refills: 0 | Status: COMPLETED | OUTPATIENT
Start: 2024-03-29 | End: 2024-03-29

## 2024-03-29 RX ORDER — LANOLIN ALCOHOL/MO/W.PET/CERES
3 CREAM (GRAM) TOPICAL AT BEDTIME
Refills: 0 | Status: DISCONTINUED | OUTPATIENT
Start: 2024-03-29 | End: 2024-04-02

## 2024-03-29 RX ADMIN — SODIUM CHLORIDE 1000 MILLILITER(S): 9 INJECTION INTRAMUSCULAR; INTRAVENOUS; SUBCUTANEOUS at 20:23

## 2024-03-29 NOTE — ED PROVIDER NOTE - OBJECTIVE STATEMENT
Patient with past medical history of vertigo, ICD in place, hypertension, mild dementia is presenting from home with concern for weakness and altered mental status.  States yesterday today family has noted patient to be slightly different compared to her baseline.  No falls, fevers, or pain.  States the last time this happened patient developed a urinary tract infection and that is why family presented today.  Patient did have 2 episodes of vomiting over the past day.  No abdominal pain.  No change in bowel movements.

## 2024-03-29 NOTE — ED PROVIDER NOTE - PROGRESS NOTE DETAILS
Patient found to have concern for bladder outlet obstruction, Yoo catheter placed, draining cloudy urine.  Ceftriaxone given.  Patient also found to be febrile, IV Tylenol ordered.  Spoke with Dr. Gurrola for admission.  Domenico Rios MD.

## 2024-03-29 NOTE — ED PROVIDER NOTE - CLINICAL SUMMARY MEDICAL DECISION MAKING FREE TEXT BOX
Patient with concern for possible UTI given history of similar episodes.  Will assess for OVIDIO and dehydration.  Given history of colon cancer, will obtain CT scan to evaluate for acute intra-abdominal pathology.  Plan on lab work, imaging, urinalysis.

## 2024-03-29 NOTE — ED ADULT TRIAGE NOTE - CHIEF COMPLAINT QUOTE
Pt BIBA from home for c/o generalized weakness and home health aide states she is not acting like her normal self- last time she presented like this she had a UTI as she has frequently has UTI's

## 2024-03-30 DIAGNOSIS — N39.0 URINARY TRACT INFECTION, SITE NOT SPECIFIED: ICD-10-CM

## 2024-03-30 DIAGNOSIS — R33.9 RETENTION OF URINE, UNSPECIFIED: ICD-10-CM

## 2024-03-30 DIAGNOSIS — I10 ESSENTIAL (PRIMARY) HYPERTENSION: ICD-10-CM

## 2024-03-30 LAB
ANION GAP SERPL CALC-SCNC: 9 MMOL/L — SIGNIFICANT CHANGE UP (ref 5–17)
APPEARANCE UR: ABNORMAL
BASOPHILS # BLD AUTO: 0.04 K/UL — SIGNIFICANT CHANGE UP (ref 0–0.2)
BASOPHILS NFR BLD AUTO: 0.2 % — SIGNIFICANT CHANGE UP (ref 0–2)
BILIRUB UR-MCNC: NEGATIVE — SIGNIFICANT CHANGE UP
BUN SERPL-MCNC: 26 MG/DL — HIGH (ref 7–23)
CALCIUM SERPL-MCNC: 7.8 MG/DL — LOW (ref 8.5–10.1)
CHLORIDE SERPL-SCNC: 107 MMOL/L — SIGNIFICANT CHANGE UP (ref 96–108)
CO2 SERPL-SCNC: 25 MMOL/L — SIGNIFICANT CHANGE UP (ref 22–31)
COLOR SPEC: YELLOW — SIGNIFICANT CHANGE UP
CREAT SERPL-MCNC: 1 MG/DL — SIGNIFICANT CHANGE UP (ref 0.5–1.3)
DIFF PNL FLD: ABNORMAL
E COLI DNA BLD POS QL NAA+NON-PROBE: SIGNIFICANT CHANGE UP
EGFR: 50 ML/MIN/1.73M2 — LOW
EOSINOPHIL # BLD AUTO: 0.01 K/UL — SIGNIFICANT CHANGE UP (ref 0–0.5)
EOSINOPHIL NFR BLD AUTO: 0.1 % — SIGNIFICANT CHANGE UP (ref 0–6)
FLUAV AG NPH QL: SIGNIFICANT CHANGE UP
FLUBV AG NPH QL: SIGNIFICANT CHANGE UP
GLUCOSE SERPL-MCNC: 142 MG/DL — HIGH (ref 70–99)
GLUCOSE UR QL: NEGATIVE MG/DL — SIGNIFICANT CHANGE UP
GRAM STN FLD: ABNORMAL
HCT VFR BLD CALC: 35.6 % — SIGNIFICANT CHANGE UP (ref 34.5–45)
HGB BLD-MCNC: 11.5 G/DL — SIGNIFICANT CHANGE UP (ref 11.5–15.5)
IMM GRANULOCYTES NFR BLD AUTO: 0.8 % — SIGNIFICANT CHANGE UP (ref 0–0.9)
KETONES UR-MCNC: ABNORMAL MG/DL
LEUKOCYTE ESTERASE UR-ACNC: ABNORMAL
LG PLATELETS BLD QL AUTO: SLIGHT — SIGNIFICANT CHANGE UP
LYMPHOCYTES # BLD AUTO: 1.87 K/UL — SIGNIFICANT CHANGE UP (ref 1–3.3)
LYMPHOCYTES # BLD AUTO: 11.4 % — LOW (ref 13–44)
MAGNESIUM SERPL-MCNC: 1.9 MG/DL — SIGNIFICANT CHANGE UP (ref 1.6–2.6)
MANUAL SMEAR VERIFICATION: SIGNIFICANT CHANGE UP
MCHC RBC-ENTMCNC: 29.6 PG — SIGNIFICANT CHANGE UP (ref 27–34)
MCHC RBC-ENTMCNC: 32.3 GM/DL — SIGNIFICANT CHANGE UP (ref 32–36)
MCV RBC AUTO: 91.8 FL — SIGNIFICANT CHANGE UP (ref 80–100)
METHOD TYPE: SIGNIFICANT CHANGE UP
MONOCYTES # BLD AUTO: 1.98 K/UL — HIGH (ref 0–0.9)
MONOCYTES NFR BLD AUTO: 12.1 % — SIGNIFICANT CHANGE UP (ref 2–14)
NEUTROPHILS # BLD AUTO: 12.37 K/UL — HIGH (ref 1.8–7.4)
NEUTROPHILS NFR BLD AUTO: 75.4 % — SIGNIFICANT CHANGE UP (ref 43–77)
NITRITE UR-MCNC: NEGATIVE — SIGNIFICANT CHANGE UP
NRBC # BLD: 0 /100 WBCS — SIGNIFICANT CHANGE UP (ref 0–0)
PH UR: 5.5 — SIGNIFICANT CHANGE UP (ref 5–8)
PLAT MORPH BLD: NORMAL — SIGNIFICANT CHANGE UP
PLATELET # BLD AUTO: 215 K/UL — SIGNIFICANT CHANGE UP (ref 150–400)
POTASSIUM SERPL-MCNC: 3.6 MMOL/L — SIGNIFICANT CHANGE UP (ref 3.5–5.3)
POTASSIUM SERPL-SCNC: 3.6 MMOL/L — SIGNIFICANT CHANGE UP (ref 3.5–5.3)
PROCALCITONIN SERPL-MCNC: 0.78 NG/ML — HIGH
PROT UR-MCNC: 100 MG/DL
RBC # BLD: 3.88 M/UL — SIGNIFICANT CHANGE UP (ref 3.8–5.2)
RBC # FLD: 13.4 % — SIGNIFICANT CHANGE UP (ref 10.3–14.5)
RBC BLD AUTO: NORMAL — SIGNIFICANT CHANGE UP
RSV RNA NPH QL NAA+NON-PROBE: SIGNIFICANT CHANGE UP
SARS-COV-2 RNA SPEC QL NAA+PROBE: SIGNIFICANT CHANGE UP
SODIUM SERPL-SCNC: 141 MMOL/L — SIGNIFICANT CHANGE UP (ref 135–145)
SP GR SPEC: 1.01 — SIGNIFICANT CHANGE UP (ref 1–1.03)
SPECIMEN SOURCE: SIGNIFICANT CHANGE UP
UROBILINOGEN FLD QL: 0.2 MG/DL — SIGNIFICANT CHANGE UP (ref 0.2–1)
WBC # BLD: 16.4 K/UL — HIGH (ref 3.8–10.5)
WBC # FLD AUTO: 16.4 K/UL — HIGH (ref 3.8–10.5)

## 2024-03-30 RX ORDER — DIGOXIN 250 MCG
62.5 TABLET ORAL DAILY
Refills: 0 | Status: DISCONTINUED | OUTPATIENT
Start: 2024-03-30 | End: 2024-04-02

## 2024-03-30 RX ORDER — DIGOXIN 250 MCG
125 TABLET ORAL DAILY
Refills: 0 | Status: DISCONTINUED | OUTPATIENT
Start: 2024-03-30 | End: 2024-03-30

## 2024-03-30 RX ORDER — CEFTRIAXONE 500 MG/1
1000 INJECTION, POWDER, FOR SOLUTION INTRAMUSCULAR; INTRAVENOUS EVERY 24 HOURS
Refills: 0 | Status: DISCONTINUED | OUTPATIENT
Start: 2024-03-30 | End: 2024-04-02

## 2024-03-30 RX ORDER — TAMSULOSIN HYDROCHLORIDE 0.4 MG/1
0.4 CAPSULE ORAL AT BEDTIME
Refills: 0 | Status: DISCONTINUED | OUTPATIENT
Start: 2024-03-30 | End: 2024-04-02

## 2024-03-30 RX ORDER — HEPARIN SODIUM 5000 [USP'U]/ML
5000 INJECTION INTRAVENOUS; SUBCUTANEOUS EVERY 8 HOURS
Refills: 0 | Status: DISCONTINUED | OUTPATIENT
Start: 2024-03-30 | End: 2024-04-02

## 2024-03-30 RX ADMIN — HEPARIN SODIUM 5000 UNIT(S): 5000 INJECTION INTRAVENOUS; SUBCUTANEOUS at 22:22

## 2024-03-30 RX ADMIN — Medication 400 MILLIGRAM(S): at 00:30

## 2024-03-30 RX ADMIN — TAMSULOSIN HYDROCHLORIDE 0.4 MILLIGRAM(S): 0.4 CAPSULE ORAL at 22:22

## 2024-03-30 RX ADMIN — Medication 180 MILLIGRAM(S): at 05:24

## 2024-03-30 RX ADMIN — HEPARIN SODIUM 5000 UNIT(S): 5000 INJECTION INTRAVENOUS; SUBCUTANEOUS at 14:36

## 2024-03-30 RX ADMIN — SODIUM CHLORIDE 50 MILLILITER(S): 9 INJECTION INTRAMUSCULAR; INTRAVENOUS; SUBCUTANEOUS at 14:35

## 2024-03-30 RX ADMIN — CEFTRIAXONE 100 MILLIGRAM(S): 500 INJECTION, POWDER, FOR SOLUTION INTRAMUSCULAR; INTRAVENOUS at 00:30

## 2024-03-30 RX ADMIN — SODIUM CHLORIDE 50 MILLILITER(S): 9 INJECTION INTRAMUSCULAR; INTRAVENOUS; SUBCUTANEOUS at 00:30

## 2024-03-30 RX ADMIN — SODIUM CHLORIDE 50 MILLILITER(S): 9 INJECTION INTRAMUSCULAR; INTRAVENOUS; SUBCUTANEOUS at 04:30

## 2024-03-30 RX ADMIN — Medication 62.5 MICROGRAM(S): at 05:24

## 2024-03-30 RX ADMIN — Medication 1000 MILLIGRAM(S): at 07:30

## 2024-03-30 RX ADMIN — HEPARIN SODIUM 5000 UNIT(S): 5000 INJECTION INTRAVENOUS; SUBCUTANEOUS at 05:25

## 2024-03-30 NOTE — PHYSICAL THERAPY INITIAL EVALUATION ADULT - PHYSICAL ASSIST/NONPHYSICAL ASSIST: STAND/SIT, REHAB EVAL
verbal cues/nonverbal cues (demo/gestures)/1 person assist
No. LIYAH screening performed.  STOP BANG Legend: 0-2 = LOW Risk; 3-4 = INTERMEDIATE Risk; 5-8 = HIGH Risk

## 2024-03-30 NOTE — H&P ADULT - PROBLEM SELECTOR PLAN 1
Admit  Pan-culture  Continue iv Rocephin  F/U culture data  ID consult  Further work-up/management pending clinical course.

## 2024-03-30 NOTE — PATIENT PROFILE ADULT - FALL HARM RISK - HARM RISK INTERVENTIONS
Assistance with ambulation/Assistance OOB with selected safe patient handling equipment/Communicate Risk of Fall with Harm to all staff/Discuss with provider need for PT consult/Monitor gait and stability/Reinforce activity limits and safety measures with patient and family/Tailored Fall Risk Interventions/Visual Cue: Yellow wristband and red socks/Bed in lowest position, wheels locked, appropriate side rails in place/Call bell, personal items and telephone in reach/Instruct patient to call for assistance before getting out of bed or chair/Non-slip footwear when patient is out of bed/[place] to call system/Physically safe environment - no spills, clutter or unnecessary equipment/Purposeful Proactive Rounding/Room/bathroom lighting operational, light cord in reach

## 2024-03-30 NOTE — CHART NOTE - NSCHARTNOTEFT_GEN_A_CORE
Called regarding +blood cultures GNR. Patient on iv ceftriaxone pending ID recommendations today. F/U C&S. Repeat blood cultures.      Culture - Blood (03.29.24 @ 20:15)   - Escherichia coli: Detec  Gram Stain:   Growth in aerobic bottle: Gram Negative Rods  Specimen Source: .Blood Blood-Peripheral  Organism: Blood Culture PCR  Culture Results:   Growth in aerobic bottle: Gram Negative Rods   Direct identification is available within approximately 3-5   hours either by Blood Panel Multiplexed PCR or Direct   MALDI-TOF. Details: [email or web address]  Organism Identification: Blood Culture PCR  Method Type: PCR
RN called, patient is a new admission, needs order for jones placed on 3/29 in ED.      - H&P to be completed by Dr. Gurrola  - Jones order placed

## 2024-03-30 NOTE — PHYSICAL THERAPY INITIAL EVALUATION ADULT - GAIT TRAINING, PT EVAL
Patient will ambulate 50 feet with CGA with rolling walker by 2 weeks to allow for increased independence in the community.

## 2024-03-30 NOTE — PHYSICAL THERAPY INITIAL EVALUATION ADULT - ADDITIONAL COMMENTS
Pt unable to provide information at this time. Niece arriving at bedside and provided information. [facility], has 24/7 home health aide. Pt ambulates with a RW and has WC for community navigation.

## 2024-03-30 NOTE — PATIENT PROFILE ADULT - FUNCTIONAL SCREEN CURRENT LEVEL: COMMUNICATION, MLM
Pt is alert/confused, hard of hearing, poor historian/2 = difficulty understanding (not related to language barrier)

## 2024-03-30 NOTE — PHYSICAL THERAPY INITIAL EVALUATION ADULT - PERTINENT HX OF CURRENT PROBLEM, REHAB EVAL
Per H&P, This is a 89F with past medical history of vertigo, ICD in place, hypertension, mild dementia is presenting from home with concern for weakness and altered mental status.  Family states that for the last two days they have noted patient to be slightly different compared to her baseline.  No falls, fevers, or pain.  They state the last time this happened patient developed a urinary tract infection and that is why family came to the ER.  Patient did have 2 episodes of vomiting over the past day.  No abdominal pain.  No change in bowel movements. On the previous admission in January, patient had presented to the ER with fever and urinary retention. She was treated with iv abx and initially jones was placed. She was able to void prior to discharge and was discharged home without a jones. This time she was found have a fever of 101.6 in the ER and to be in urinary retention as well. Jones was placed in the ER.

## 2024-03-30 NOTE — PATIENT PROFILE ADULT - ABILITY TO HEAR (WITH HEARING AID OR HEARING APPLIANCE IF NORMALLY USED):
no
Pt has left hearing aide/Mildly to Moderately Impaired: difficulty hearing in some environments or speaker may need to increase volume or speak distinctly

## 2024-03-30 NOTE — PHYSICAL THERAPY INITIAL EVALUATION ADULT - GENERAL OBSERVATIONS, REHAB EVAL
Patient was received semi-supine in bed in NAD, +2L O2 via NC, +Yoo. Niece arriving at bedside after completion of PT eval.

## 2024-03-30 NOTE — H&P ADULT - PROBLEM SELECTOR PLAN 2
Urinary retention causing b/l hydro  Yoo placed  Start flomax   consult  Further work-up/management pending clinical course.

## 2024-03-30 NOTE — PHYSICAL THERAPY INITIAL EVALUATION ADULT - DIAGNOSIS, PT EVAL
herbal tea/no
Patient presents with weakness, impaired balance, and decreased endurance impacting ability to complete functional mobility/transfers leading to increased risk for falls.

## 2024-03-30 NOTE — ED ADULT NURSE NOTE - OBJECTIVE STATEMENT
89 female received, resting comfortably in bed. No acute distress noted. C/o weakness. No facial droop noted.

## 2024-03-30 NOTE — ED ADULT NURSE NOTE - NSFALLUNIVINTERV_ED_ALL_ED
Bed/Stretcher in lowest position, wheels locked, appropriate side rails in place/Call bell, personal items and telephone in reach/Instruct patient to call for assistance before getting out of bed/chair/stretcher/Non-slip footwear applied when patient is off stretcher/[place] to call system/Physically safe environment - no spills, clutter or unnecessary equipment/Purposeful proactive rounding/Room/bathroom lighting operational, light cord in reach 11-Sep-2021 14:04

## 2024-03-30 NOTE — CONSULT NOTE ADULT - SUBJECTIVE AND OBJECTIVE BOX
[facility] DIVISION OF INFECTIOUS DISEASES  [place] [address], Y. Patel, [facility]  [phone]  ([phone] - weekdays after 5pm and weekends)    JEREMIE PUGH  89y, Female  [id number]    HPI:  Patient is a 89F with past medical history of vertigo, ICD in place, hypertension, mild dementia is presenting from home with concern for weakness and altered mental status.  Family states that for the last two days they have noted patient to be slightly different compared to her baseline.  No falls, fevers, or pain.  They state the last time this happened patient developed a urinary tract infection and that is why family came to the ER.  Patient did have 2 episodes of vomiting over the past day.  No abdominal pain.  No change in bowel movements. On the previous admission in January, patient had presented to the ER with fever and urinary retention. She was treated with iv abx and initially jones was placed. She was able to void prior to discharge and was discharged home without a jones. This time she was found have a fever of 101.6 in the ER and to be in urinary retention as well. Jones was placed in the ER. (30 Mar 2024 06:22) Patient seen and examined at bedside this afternoon. Patient sitting up in chair, very [place], states she feels well, denies any pain.   ROS: 14 point review of systems completed, pertinent positives and negatives as per HPI.    Allergies: No Known Allergies    PMH -- Hypertension  Pacemaker  Vertigo  Colon cancer  [place] (hard of hearing)    PSH -- Cancer  History of colon resection    FH -- No pertinent family history in first degree relatives  Social History -- denies tobacco, alcohol or illicit drug use    Physical Exam--  Vital Signs Last 24 Hrs  T(F): 98.4 (30 Mar 2024 11:50), Max: 101.6 (29 Mar 2024 23:26)  HR: 60 (30 Mar 2024 11:50) (60 - 91)  BP: 112/56 (30 Mar 2024 11:50) (112/56 - 129/69)  RR: 17 (30 Mar 2024 11:50) (16 - 18)  SpO2: 98% (30 Mar 2024 11:50) (94% - 99%)  General: no acute distress, pleasant, [place]  HEENT: NC/AT, EOMI, anicteric, neck supple  Lungs: .claer  Heart: S1, S2 present, normal rate   Abdomen: Soft. Nondistended. Nontender.  Neuro: AAOx3, no obvious focal deficits   Extremities: No cyanosis. No edema.   Skin: Warm. Dry. No visible rash.  Psychiatric: Appropriate affect and mood for situation.   Lines: PIV; jones with yellow urine with some purulence     Laboratory & Imaging Data--  CBC:                       11.5   16.40 )-----------( 215      ( 30 Mar 2024 04:43 )             35.6     WBC Count: 16.40 K/uL (03-30-24 @ 04:43)  WBC Count: 14.61 K/uL (03-29-24 @ 20:00)    CMP: 03-30    141  |  107  |  26<H>  ----------------------------<  142<H>  3.6   |  25  |  1.00    Ca    7.8<L>      30 Mar 2024 04:43  Mg     1.9     03-30    TPro  7.8  /  Alb  3.2<L>  /  TBili  0.7  /  DBili  x   /  AST  14<L>  /  ALT  18  /  AlkPhos  91  03-29    LIVER FUNCTIONS - ( 29 Mar 2024 20:00 )  Alb: 3.2 g/dL / Pro: 7.8 g/dL / ALK PHOS: 91 U/L / ALT: 18 U/L / AST: 14 U/L / GGT: x           Urinalysis (03.30.24 @ 02:52)    pH Urine: 5.5   Glucose Qualitative, Urine: Negative mg/dL   Blood, Urine: Moderate   Color: Yellow   Urine Appearance: Turbid   Bilirubin: Negative   Ketone - Urine: Trace mg/dL   Specific Gravity: 1.009   Protein, Urine: 100 mg/dL   Urobilinogen: 0.2 mg/dL   Nitrite: Negative   Leukocyte Esterase Concentration: Large  Urine Microscopic-Add On (NC) (03.30.24 @ 02:52)    Red Blood Cell - Urine: 9 /HPF   White Blood Cell - Urine: 20 /HPF   Bacteria: Many /HPF   Comment - Urine: WBC clumps present   Squamous Epithelial Cells: Present    Microbiology: reviewed  Culture - Blood (collected 03-29-24 @ 20:15)  Source: .Blood Blood-Peripheral  Gram Stain (03-30-24 @ 12:49):    Growth in aerobic bottle: Gram Negative Rods  Preliminary Report (03-30-24 @ 12:50):    Growth in aerobic bottle: Gram Negative Rods    Direct identification is available within approximately 3-5    hours either by Blood Panel Multiplexed PCR or Direct    MALDI-TOF. Details: [email or web address]  Organism: Blood Culture PCR (03-30-24 @ 15:21)  Organism: Blood Culture PCR (03-30-24 @ 15:21)      -  Escherichia coli: Detec      Method Type: PCR    SARS-CoV-2 Result: NotDetec (30 Mar 2024 00:43)    Radiology--reviewed  < from: CT Abdomen and Pelvis No Cont (03.29.24 @ 21:36) >  IMPRESSION:  Moderate bilateral hydronephrosis to the level of distended trabeculated   urinary bladder. Findings are suggestive of urinary retention/outlet   obstruction.  Colonic diverticulosis without diverticulitis.    < end of copied text >    Active Medications--  acetaminophen     Tablet .. 650 milliGRAM(s) Oral every 6 hours PRN  aluminum hydroxide/magnesium hydroxide/simethicone Suspension 30 milliLiter(s) Oral every 4 hours PRN  cefTRIAXone   IVPB 1000 milliGRAM(s) IV Intermittent every 24 hours  digoxin     Tablet 62.5 MICROGram(s) Oral daily  diltiazem    milliGRAM(s) Oral daily  heparin   Injectable 5000 Unit(s) SubCutaneous every 8 hours  melatonin 3 milliGRAM(s) Oral at bedtime PRN  ondansetron Injectable 4 milliGRAM(s) IV Push every 6 hours PRN  sodium chloride 0.9%. 1000 milliLiter(s) IV Continuous <Continuous>  tamsulosin 0.4 milliGRAM(s) Oral at bedtime    Current Antimicrobials:   cefTRIAXone   IVPB 1000 milliGRAM(s) IV Intermittent every 24 hours    Prior/Completed Antimicrobials:  cefTRIAXone   IVPB    
Urology PA CONSULT NOTE:    CHIEF COMPLAINT:  Patient is a 89y old  Female who presents with a chief complaint of altered mental status and weakness (30 Mar 2024 06:22)      HPI FROM ED:  HPI:  This is a 89F with past medical history of vertigo, ICD in place, hypertension, mild dementia is presenting from home with concern for weakness and altered mental status.  Family states that for the last two days they have noted patient to be slightly different compared to her baseline.  No falls, fevers, or pain.  They state the last time this happened patient developed a urinary tract infection and that is why family came to the ER.  Patient did have 2 episodes of vomiting over the past day.  No abdominal pain.  No change in bowel movements. On the previous admission in January, patient had presented to the ER with fever and urinary retention. She was treated with iv abx and initially jones was placed. She was able to void prior to discharge and was discharged home without a jones. This time she was found have a fever of 101.6 in the ER and to be in urinary retention as well. Jones was placed in the ER. (30 Mar 2024 06:22)    Consult HPI:   89 yo F presented with altered mental status and weakness. Further workup demonstrated UA + for moderate blood and bacteria. Leukocytosis present. Urology consult placed for evaluation of urinary retention.   Patient resides in an assisted living facility. She denies any urinary symptoms including burning, pain or urgency. She denies back or flank pain. She denies having issues voiding or using the bathroom. She reports that she is able to go to the bathroom herself and uses pads. She denies voiding in diapers or having a jones prior to admission.       PAST MEDICAL HISTORY:  PAST MEDICAL & SURGICAL HISTORY:  Hypertension      Pacemaker      Vertigo      Colon cancer  s/p resection 04      [place] (hard of hearing)  right ear hearing aid      History of colon resection  2004          PAST SURGICAL HISTORY:    Pacemaker  Colon resection- 2004     REVIEW OF SYSTEMS:  General/Constitutional: No acute distress, no headache, weakness, fevers, or chills   HEENT: Denies auditory or visual changes/disturbances, no vertigo, no throat pain, no dysphagia    Neck: Denies neck pain/stiffness, denies swelling/lumps/hoarseness   Lymphatic: Denies lumps or swelling in the axillae, groin, or neck bilaterally   Respiratory: Denies cough/hemoptysis, denies wheezing/SOB/dyspnea  Cardiac: Denies chest pain, palpitations  Abdomen: Denies abdominal bloating/fullness, nausea or vomiting, denies abdominal pain  Extremities: Denies sores, swelling, discoloration bilat UE/LE  Genitourinary: Denies urinary issues or complaints, denies dysuria/hematuria  Neuro: Denies weakness, paraesthesias, paralysis, syncope, loss of vision  Skin: Denies pruritus, pain, rashes  Psych: Denies hallucinations, visual disturbances, or depression    MEDICATIONS:  Home Medications:  digoxin 125 mcg (0.125 mg) oral tablet: 1 tab(s) orally once a day (27 Jul 2016 15:45)  diltiazem 24 hour extended release: 180  orally once a day (27 Jul 2016 15:45)  furosemide 20 mg oral tablet: 1 tab(s) orally once a day (27 Jul 2016 15:45)    MEDICATIONS  (STANDING):  cefTRIAXone   IVPB 1000 milliGRAM(s) IV Intermittent every 24 hours  digoxin     Tablet 62.5 MICROGram(s) Oral daily  diltiazem    milliGRAM(s) Oral daily  heparin   Injectable 5000 Unit(s) SubCutaneous every 8 hours  sodium chloride 0.9%. 1000 milliLiter(s) (50 mL/Hr) IV Continuous <Continuous>  tamsulosin 0.4 milliGRAM(s) Oral at bedtime    MEDICATIONS  (PRN):  acetaminophen     Tablet .. 650 milliGRAM(s) Oral every 6 hours PRN Temp greater or equal to 38C (100.4F), Mild Pain (1 - 3)  aluminum hydroxide/magnesium hydroxide/simethicone Suspension 30 milliLiter(s) Oral every 4 hours PRN Dyspepsia  melatonin 3 milliGRAM(s) Oral at bedtime PRN Insomnia  ondansetron Injectable 4 milliGRAM(s) IV Push every 6 hours PRN Nausea and/or Vomiting      ALLERGIES:  Allergies    No Known Allergies    Intolerances        SOCIAL HISTORY:  Social History:    Smoking: Yes [ ]  No [x ]   ______pk yrs  ETOH  Yes [ ]  No [ x]  Social [ ]  DRUGS:  Yes [ ]  No [x ]  if so what______________      VITAL SIGNS:  Vital Signs Last 24 Hrs  T(C): 36.6 (30 Mar 2024 04:41), Max: 38.7 (29 Mar 2024 23:26)  T(F): 97.9 (30 Mar 2024 04:41), Max: 101.6 (29 Mar 2024 23:26)  HR: 62 (30 Mar 2024 04:41) (62 - 91)  BP: 114/58 (30 Mar 2024 04:41) (114/58 - 129/69)  RR: 16 (30 Mar 2024 04:41) (16 - 18)  SpO2: 98% (30 Mar 2024 04:41) (94% - 99%)    Parameters below as of 30 Mar 2024 04:41  Patient On (Oxygen Delivery Method): nasal cannula  O2 Flow (L/min): 2      PHYSICAL EXAM:  General: No acute distress, appears comfortable, well-groomed, appears stated age  Head, Eyes, Ears, Nose, Throat: Normal cephalic/atraumatic, anicteric, conjunctiva-non injected and moist, vision grossly intact, hearing grossly intact, no nasal discharge, ears and nose symmetrical and atraumatic.  Nasal, oral, and oropharyngeal mucosa pink moist with no evidence of ulceration  Neck: Supple, carotids have good upstroke, trachea in the midline, without JVD or thyromegaly  Lymphatic: No evidence of masses or lymphadenopathy in the head, neck, trunk, axillary, inguinal, or supraclavicular regions  Chest: No wheezing, no rales, no ronchi, with good inspiratory effort  Heart: Heart rate normal   Abdomen: Soft, non tender, good bowel sounds present in all four quadrants.  No guarding, rebound, and no peritoneal signs.  No evidence of hepatosplenomegaly.  No evidence of abdominal wall hernias.  Inguinal regions are unremarkable with no evidence of hernias.   Extremity: No swelling, or open sores, no gross deformities,  good range of motion, 2+ peripheral pulses bilat UE/LE, no edema,  negative Aleksandr's sign, no lymphadenopathy  Neuro: Alert and oriented x2  Psychiatric: Awake , alert, oriented x3 with an appropriate affect.   Urogenital: Urinary jones catheter in place with yellow, cloudy urine with sediment present. No carlos blood present.     LABS:                        11.5   16.40 )-----------( 215      ( 30 Mar 2024 04:43 )             35.6     03-30    141  |  107  |  26<H>  ----------------------------<  142<H>  3.6   |  25  |  1.00    Ca    7.8<L>      30 Mar 2024 04:43  Mg     1.9     03-30    TPro  7.8  /  Alb  3.2<L>  /  TBili  0.7  /  DBili  x   /  AST  14<L>  /  ALT  18  /  AlkPhos  91  03-29    PT/INR - ( 29 Mar 2024 20:00 )   PT: 12.0 sec;   INR: 1.03 ratio         PTT - ( 29 Mar 2024 20:00 )  PTT:28.8 sec  Urinalysis Basic - ( 30 Mar 2024 04:43 )    Color: x / Appearance: x / SG: x / pH: x  Gluc: 142 mg/dL / Ketone: x  / Bili: x / Urobili: x   Blood: x / Protein: x / Nitrite: x   Leuk Esterase: x / RBC: x / WBC x   Sq Epi: x / Non Sq Epi: x / Bacteria: x      LIVER FUNCTIONS - ( 29 Mar 2024 20:00 )  Alb: 3.2 g/dL / Pro: 7.8 g/dL / ALK PHOS: 91 U/L / ALT: 18 U/L / AST: 14 U/L / GGT: x               RADIOLOGY & ADDITIONAL STUDIES:    CT Abdomen/Pelvis:   IMPRESSION:  Moderate bilateral hydronephrosis to the level of distended trabeculated   urinary bladder. Findings are suggestive of urinary retention/outlet   obstruction.  Colonic diverticulosis without diverticulitis.        ASSESSMENT:    1. UTI   2. Urinary Retention     PLAN:  1. UTI   - Keep urinary jones  -Monitor I&O's  -Cont IV abx   -Follow up urinary culture   -Pain control as needed   -Monitor vitals   -Trend labs daily     2. Urinary retention   -Monitor I&Os   - CT demonstrates b/l hydronephrosis   -Monitor

## 2024-03-30 NOTE — H&P ADULT - NSICDXPASTMEDICALHX_GEN_ALL_CORE_FT
PAST MEDICAL HISTORY:  Colon cancer s/p resection 04    [place] (hard of hearing) right ear hearing aid    Hypertension     Pacemaker     Vertigo

## 2024-03-30 NOTE — PHYSICAL THERAPY INITIAL EVALUATION ADULT - PATIENT PROFILE REVIEW, REHAB EVAL
PT orders received: ambulate with assistance. Consult with RN Solange, pt may participate in PT evaluation./yes

## 2024-03-30 NOTE — H&P ADULT - HISTORY OF PRESENT ILLNESS
This is a 89F with past medical history of vertigo, ICD in place, hypertension, mild dementia is presenting from home with concern for weakness and altered mental status.  Family states that for the last two days they have noted patient to be slightly different compared to her baseline.  No falls, fevers, or pain.  They state the last time this happened patient developed a urinary tract infection and that is why family came to the ER.  Patient did have 2 episodes of vomiting over the past day.  No abdominal pain.  No change in bowel movements. On the previous admission in January, patient had presented to the ER with fever and urinary retention. She was treated with iv abx and initially jones was placed. She was able to void prior to discharge and was discharged home without a jones. This time she was found to be in urinary retention as well and jones was placed in the ER. This is a 89F with past medical history of vertigo, ICD in place, hypertension, mild dementia is presenting from home with concern for weakness and altered mental status.  Family states that for the last two days they have noted patient to be slightly different compared to her baseline.  No falls, fevers, or pain.  They state the last time this happened patient developed a urinary tract infection and that is why family came to the ER.  Patient did have 2 episodes of vomiting over the past day.  No abdominal pain.  No change in bowel movements. On the previous admission in January, patient had presented to the ER with fever and urinary retention. She was treated with iv abx and initially jones was placed. She was able to void prior to discharge and was discharged home without a jones. This time she was found have a fever of 101.6 in the ER and to be in urinary retention as well. Jonse was placed in the ER.

## 2024-03-30 NOTE — CONSULT NOTE ADULT - ASSESSMENT
Patient is a 89F with past medical history of vertigo, ICD in place, hypertension, mild dementia is presenting from home with concern for weakness and altered mental status.     Sepsis due to E.coli bacteremia likely  source  - AMS with fever noted in ER with leukocytosis  - s/p jones placement, noted with purulent urine   - UA with pyuria -- 20WBC with clumps of WBCs, large LE, negative nitrites, many bacteria   - Bcx now with GNR -- E.coli per PCR  - CTAP with moderate b/l hydronephrosis to level of distended trabeculated bladder; diverticulosis without diverticulitis   - Urology following     Recommendations:  Follow Bcx for E.coli sensitivities  Follow Ucx - in process   Repeat Bcx x2 ordered for AM  Continue on ceftriaxone 1g IV Q24 for now  Jones care  Monitor temps/WBC    Lakeisha Goins M.D.  [facility], Division of Infectious Diseases  [phone]  After 5pm on weekdays and all day on weekends - please call [phone]

## 2024-03-31 LAB
ANION GAP SERPL CALC-SCNC: 5 MMOL/L — SIGNIFICANT CHANGE UP (ref 5–17)
BUN SERPL-MCNC: 19 MG/DL — SIGNIFICANT CHANGE UP (ref 7–23)
CALCIUM SERPL-MCNC: 8.7 MG/DL — SIGNIFICANT CHANGE UP (ref 8.5–10.1)
CHLORIDE SERPL-SCNC: 112 MMOL/L — HIGH (ref 96–108)
CO2 SERPL-SCNC: 26 MMOL/L — SIGNIFICANT CHANGE UP (ref 22–31)
CREAT SERPL-MCNC: 0.8 MG/DL — SIGNIFICANT CHANGE UP (ref 0.5–1.3)
EGFR: 65 ML/MIN/1.73M2 — SIGNIFICANT CHANGE UP
GLUCOSE SERPL-MCNC: 115 MG/DL — HIGH (ref 70–99)
GRAM STN FLD: ABNORMAL
GRAM STN FLD: ABNORMAL
HCT VFR BLD CALC: 33.8 % — LOW (ref 34.5–45)
HGB BLD-MCNC: 11 G/DL — LOW (ref 11.5–15.5)
MAGNESIUM SERPL-MCNC: 1.9 MG/DL — SIGNIFICANT CHANGE UP (ref 1.6–2.6)
MCHC RBC-ENTMCNC: 29.6 PG — SIGNIFICANT CHANGE UP (ref 27–34)
MCHC RBC-ENTMCNC: 32.5 GM/DL — SIGNIFICANT CHANGE UP (ref 32–36)
MCV RBC AUTO: 90.9 FL — SIGNIFICANT CHANGE UP (ref 80–100)
NRBC # BLD: 0 /100 WBCS — SIGNIFICANT CHANGE UP (ref 0–0)
PLATELET # BLD AUTO: 212 K/UL — SIGNIFICANT CHANGE UP (ref 150–400)
POTASSIUM SERPL-MCNC: 3.4 MMOL/L — LOW (ref 3.5–5.3)
POTASSIUM SERPL-SCNC: 3.4 MMOL/L — LOW (ref 3.5–5.3)
RBC # BLD: 3.72 M/UL — LOW (ref 3.8–5.2)
RBC # FLD: 13.4 % — SIGNIFICANT CHANGE UP (ref 10.3–14.5)
SODIUM SERPL-SCNC: 143 MMOL/L — SIGNIFICANT CHANGE UP (ref 135–145)
SPECIMEN SOURCE: SIGNIFICANT CHANGE UP
WBC # BLD: 14 K/UL — HIGH (ref 3.8–10.5)
WBC # FLD AUTO: 14 K/UL — HIGH (ref 3.8–10.5)

## 2024-03-31 RX ORDER — POTASSIUM CHLORIDE 20 MEQ
40 PACKET (EA) ORAL ONCE
Refills: 0 | Status: COMPLETED | OUTPATIENT
Start: 2024-03-31 | End: 2024-03-31

## 2024-03-31 RX ADMIN — Medication 180 MILLIGRAM(S): at 05:19

## 2024-03-31 RX ADMIN — Medication 62.5 MICROGRAM(S): at 05:19

## 2024-03-31 RX ADMIN — CEFTRIAXONE 100 MILLIGRAM(S): 500 INJECTION, POWDER, FOR SOLUTION INTRAMUSCULAR; INTRAVENOUS at 00:37

## 2024-03-31 RX ADMIN — HEPARIN SODIUM 5000 UNIT(S): 5000 INJECTION INTRAVENOUS; SUBCUTANEOUS at 14:51

## 2024-03-31 RX ADMIN — TAMSULOSIN HYDROCHLORIDE 0.4 MILLIGRAM(S): 0.4 CAPSULE ORAL at 21:40

## 2024-03-31 RX ADMIN — HEPARIN SODIUM 5000 UNIT(S): 5000 INJECTION INTRAVENOUS; SUBCUTANEOUS at 05:19

## 2024-03-31 RX ADMIN — SODIUM CHLORIDE 50 MILLILITER(S): 9 INJECTION INTRAMUSCULAR; INTRAVENOUS; SUBCUTANEOUS at 21:40

## 2024-03-31 RX ADMIN — Medication 40 MILLIEQUIVALENT(S): at 14:51

## 2024-03-31 RX ADMIN — HEPARIN SODIUM 5000 UNIT(S): 5000 INJECTION INTRAVENOUS; SUBCUTANEOUS at 21:40

## 2024-03-31 NOTE — PROGRESS NOTE ADULT - SUBJECTIVE AND OBJECTIVE BOX
Date of Service: 03-31-24 @ 19:40    Patient is a 89y old  Female who presents with a chief complaint of UTI (31 Mar 2024 17:23)      INTERVAL HPI/OVERNIGHT EVENTS: Patient seen and examined. NAD. No complaints.    Vital Signs Last 24 Hrs  T(C): 36.8 (31 Mar 2024 11:59), Max: 36.8 (31 Mar 2024 11:59)  T(F): 98.3 (31 Mar 2024 11:59), Max: 98.3 (31 Mar 2024 11:59)  HR: 68 (31 Mar 2024 11:59) (68 - 74)  BP: 132/68 (31 Mar 2024 11:59) (112/61 - 132/68)  BP(mean): --  RR: 19 (31 Mar 2024 11:59) (18 - 19)  SpO2: 93% (31 Mar 2024 11:59) (93% - 96%)    Parameters below as of 31 Mar 2024 11:59  Patient On (Oxygen Delivery Method): room air        03-31    143  |  112<H>  |  19  ----------------------------<  115<H>  3.4<L>   |  26  |  0.80    Ca    8.7      31 Mar 2024 04:40  Mg     1.9     03-31    TPro  7.8  /  Alb  3.2<L>  /  TBili  0.7  /  DBili  x   /  AST  14<L>  /  ALT  18  /  AlkPhos  91  03-29                          11.0   14.00 )-----------( 212      ( 31 Mar 2024 04:40 )             33.8     PT/INR - ( 29 Mar 2024 20:00 )   PT: 12.0 sec;   INR: 1.03 ratio         PTT - ( 29 Mar 2024 20:00 )  PTT:28.8 sec  CAPILLARY BLOOD GLUCOSE        Urinalysis Basic - ( 31 Mar 2024 04:40 )    Color: x / Appearance: x / SG: x / pH: x  Gluc: 115 mg/dL / Ketone: x  / Bili: x / Urobili: x   Blood: x / Protein: x / Nitrite: x   Leuk Esterase: x / RBC: x / WBC x   Sq Epi: x / Non Sq Epi: x / Bacteria: x    Culture - Blood (03.29.24 @ 20:15)   - Escherichia coli: Detec  Gram Stain:   Growth in aerobic bottle: Gram Negative Rods   Growth in anaerobic bottle: Gram Negative Rods  Specimen Source: .Blood Blood-Peripheral  Organism: Blood Culture PCR  Culture Results:   Growth in aerobic bottle: Escherichia coli   Growth in anaerobic bottle: Gram Negative Rods   Direct identification is available within approximately 3-5   hours either by Blood Panel Multiplexed PCR or Direct   MALDI-TOF. Details: [email or web address]  Organism Identification: Blood Culture PCR  Method Type: PCR          acetaminophen     Tablet .. 650 milliGRAM(s) Oral every 6 hours PRN  aluminum hydroxide/magnesium hydroxide/simethicone Suspension 30 milliLiter(s) Oral every 4 hours PRN  cefTRIAXone   IVPB 1000 milliGRAM(s) IV Intermittent every 24 hours  digoxin     Tablet 62.5 MICROGram(s) Oral daily  diltiazem    milliGRAM(s) Oral daily  heparin   Injectable 5000 Unit(s) SubCutaneous every 8 hours  melatonin 3 milliGRAM(s) Oral at bedtime PRN  ondansetron Injectable 4 milliGRAM(s) IV Push every 6 hours PRN  sodium chloride 0.9%. 1000 milliLiter(s) IV Continuous <Continuous>  tamsulosin 0.4 milliGRAM(s) Oral at bedtime              REVIEW OF SYSTEMS:  CONSTITUTIONAL: No fever, no weight loss, or no fatigue  NECK: No pain, no stiffness  RESPIRATORY: No cough, no wheezing, no chills, no hemoptysis, No shortness of breath  CARDIOVASCULAR: No chest pain, no palpitations, no dizziness, no leg swelling  GASTROINTESTINAL: No abdominal pain. No nausea, no vomiting, no hematemesis; No diarrhea, no constipation. No melena, no hematochezia.  GENITOURINARY: No dysuria, no frequency, no hematuria, no incontinence  NEUROLOGICAL: No headaches, no loss of strength, no numbness, no tremors  SKIN: No itching, no burning  MUSCULOSKELETAL: No joint pain, no swelling; No muscle, no back, no extremity pain  PSYCHIATRIC: No depression, no mood swings,   HEME/LYMPH: No easy bruising, no bleeding gums  ALLERY AND IMMUNOLOGIC: No hives       Consultant(s) Notes Reviewed:  [X] YES  [ ] NO    PHYSICAL EXAM:  GENERAL: NAD  HEAD:  Atraumatic, Normocephalic  EYES: EOMI, PERRLA, conjunctiva and sclera clear  ENMT: No tonsillar erythema, exudates, or enlargement; Moist mucous membranes  NECK: Supple, No JVD  NERVOUS SYSTEM:  Awake & alert  CHEST/LUNG: Clear to auscultation bilaterally; No rales, rhonchi, wheezing,  HEART: Regular rate and rhythm  ABDOMEN: Soft, Nontender, Nondistended; Bowel sounds present  EXTREMITIES:  No clubbing, cyanosis, or edema  LYMPH: No lymphadenopathy noted  SKIN: No rashes      Advanced care planning discussed with patient/family [X] YES   [ ] NO    Advanced care planning discussed with patient/family. Patient's health status was discussed. All appropriate changes have been made regarding patient's end-of-life care. Advanced care planning forms reviewed/discussed/completed.  20 minutes spent.

## 2024-03-31 NOTE — PROVIDER CONTACT NOTE (CRITICAL VALUE NOTIFICATION) - TEST AND RESULT REPORTED:
+ blood cultures / growth in aerobic bottle
blood culture collected 3/29 preliminary results    growth in 2 aerobic bottles both gram negative rods and one growth in anaerobic bottle also gram negative rods
blood cult. of 3/29/24 growth in aerobic bottle gm- rods

## 2024-03-31 NOTE — PROGRESS NOTE ADULT - SUBJECTIVE AND OBJECTIVE BOX
[facility] DIVISION OF INFECTIOUS DISEASES  MICHO[place]ANO [place]. Patel, S. Shah, G. Dean  [phone]  ([phone] - weekdays after 5pm and weekends)    Name: JEREMIE PUGH  Age/Gender: 89y Female  MRN: [id number]    Interval History:  Patient seen and examined.  Feels better, no pain or new complaints  Notes reviewed.   No concerning overnight events.  Afebrile. Niece at bedside   Allergies: No Known Allergies      Objective:  Vitals:   T(F): 98.3 (03-31-24 @ 11:59), Max: 98.3 (03-31-24 @ 11:59)  HR: 68 (03-31-24 @ 11:59) (68 - 74)  BP: 132/68 (03-31-24 @ 11:59) (112/61 - 132/68)  RR: 19 (03-31-24 @ 11:59) (18 - 19)  SpO2: 93% (03-31-24 @ 11:59) (93% - 96%)  Physical Examination:  General: no acute distress, nontoxic   HEENT: NC/AT, EOMI, anicteric  Cardio: S1, S2 present, normal rate  Resp: clear to auscultation bilaterally  Abd: soft, NT, ND, + BS  Neuro: awake, alert, [place]  Ext: no edema, no cyanosis  Skin: warm, dry, no visible rash  Lines: PIV; jones draining yellow urine slight sediment in tubing     Laboratory Studies:  CBC:                       11.0   14.00 )-----------( 212      ( 31 Mar 2024 04:40 )             33.8     WBC Trend:  14.00 03-31-24 @ 04:40  16.40 03-30-24 @ 04:43  14.61 03-29-24 @ 20:00    CMP: 03-31    143  |  112<H>  |  19  ----------------------------<  115<H>  3.4<L>   |  26  |  0.80    Ca    8.7      31 Mar 2024 04:40  Mg     1.9     03-31    TPro  7.8  /  Alb  3.2<L>  /  TBili  0.7  /  DBili  x   /  AST  14<L>  /  ALT  18  /  AlkPhos  91  03-29    Creatinine: 0.80 mg/dL (03-31-24 @ 04:40)  Creatinine: 1.00 mg/dL (03-30-24 @ 04:43)  Creatinine: 1.20 mg/dL (03-29-24 @ 20:00)    LIVER FUNCTIONS - ( 29 Mar 2024 20:00 )  Alb: 3.2 g/dL / Pro: 7.8 g/dL / ALK PHOS: 91 U/L / ALT: 18 U/L / AST: 14 U/L / GGT: x           Microbiology: reviewed   Culture - Blood (collected 03-29-24 @ 20:15)  Source: .Blood Blood-Peripheral  Gram Stain (03-31-24 @ 07:36):    Growth in aerobic bottle: Gram Negative Rods    Growth in anaerobic bottle: Gram Negative Rods  Preliminary Report (03-31-24 @ 12:41):    Growth in aerobic bottle: Escherichia coli    Growth in anaerobic bottle: Gram Negative Rods    Direct identification is available within approximately 3-5    hours either by Blood Panel Multiplexed PCR or Direct    MALDI-TOF. Details: [email or web address]  Organism: Blood Culture PCR (03-30-24 @ 15:21)  Organism: Blood Culture PCR (03-30-24 @ 15:21)      Method Type: PCR      -  Escherichia coli: Detec    Culture - Blood (collected 03-29-24 @ 20:00)  Source: .Blood Blood-Peripheral  Gram Stain (03-31-24 @ 03:08):    Growth in anaerobic bottle: Gram Negative Rods  Preliminary Report (03-31-24 @ 17:34):    Growth in anaerobic bottle: Escherichia coli    See previous culture [id number]    SARS-CoV-2 Result: NotDetec (30 Mar 2024 00:43)    Radiology: reviewed     Medications:  acetaminophen     Tablet .. 650 milliGRAM(s) Oral every 6 hours PRN  aluminum hydroxide/magnesium hydroxide/simethicone Suspension 30 milliLiter(s) Oral every 4 hours PRN  cefTRIAXone   IVPB 1000 milliGRAM(s) IV Intermittent every 24 hours  digoxin     Tablet 62.5 MICROGram(s) Oral daily  diltiazem    milliGRAM(s) Oral daily  heparin   Injectable 5000 Unit(s) SubCutaneous every 8 hours  melatonin 3 milliGRAM(s) Oral at bedtime PRN  ondansetron Injectable 4 milliGRAM(s) IV Push every 6 hours PRN  sodium chloride 0.9%. 1000 milliLiter(s) IV Continuous <Continuous>  tamsulosin 0.4 milliGRAM(s) Oral at bedtime    Current Antimicrobials:  cefTRIAXone   IVPB 1000 milliGRAM(s) IV Intermittent every 24 hours    Prior/Completed Antimicrobials:  cefTRIAXone   IVPB

## 2024-03-31 NOTE — PROGRESS NOTE ADULT - SUBJECTIVE AND OBJECTIVE BOX
a/w UTI, urinary retention , b/l hydro   NAEON, AVSS    SUBJECTIVE:  Patient seen and examined at bedside this morning, denies any acute complaints, denies any abdominal or back pain, jones in place with minimal discomfort, no fevers or chills overnight     MEDICATIONS  (STANDING):  cefTRIAXone   IVPB 1000 milliGRAM(s) IV Intermittent every 24 hours  digoxin     Tablet 62.5 MICROGram(s) Oral daily  diltiazem    milliGRAM(s) Oral daily  heparin   Injectable 5000 Unit(s) SubCutaneous every 8 hours  sodium chloride 0.9%. 1000 milliLiter(s) (50 mL/Hr) IV Continuous <Continuous>  tamsulosin 0.4 milliGRAM(s) Oral at bedtime    MEDICATIONS  (PRN):  acetaminophen     Tablet .. 650 milliGRAM(s) Oral every 6 hours PRN Temp greater or equal to 38C (100.4F), Mild Pain (1 - 3)  aluminum hydroxide/magnesium hydroxide/simethicone Suspension 30 milliLiter(s) Oral every 4 hours PRN Dyspepsia  melatonin 3 milliGRAM(s) Oral at bedtime PRN Insomnia  ondansetron Injectable 4 milliGRAM(s) IV Push every 6 hours PRN Nausea and/or Vomiting      Vital Signs Last 24 Hrs  T(C): 36.8 (31 Mar 2024 11:59), Max: 36.8 (31 Mar 2024 11:59)  T(F): 98.3 (31 Mar 2024 11:59), Max: 98.3 (31 Mar 2024 11:59)  HR: 68 (31 Mar 2024 11:59) (68 - 74)  BP: 132/68 (31 Mar 2024 11:59) (112/61 - 132/68)  BP(mean): --  RR: 19 (31 Mar 2024 11:59) (18 - 19)  SpO2: 93% (31 Mar 2024 11:59) (93% - 96%)    Parameters below as of 31 Mar 2024 11:59  Patient On (Oxygen Delivery Method): room air        PHYSICAL EXAM:  Constitutional: AAOx3, no acute distress  HEENT: NCAT, airway patent  Cardiovascular: RRR, pulses present bilaterally  Respiratory: nonlabored breathing  Gastrointestinal: abdomen soft, nontender, non distended  Neuro: no focal deficits  Extremities: non edematous, no calf pain bilaterally   : jones in place with cloudy yellow urine output     I&O's Detail    30 Mar 2024 07:01  -  31 Mar 2024 07:00  --------------------------------------------------------  IN:  Total IN: 0 mL    OUT:    Voided (mL): 400 mL  Total OUT: 400 mL    Total NET: -400 mL      31 Mar 2024 07:01  -  31 Mar 2024 17:24  --------------------------------------------------------  IN:  Total IN: 0 mL    OUT:    Voided (mL): 600 mL  Total OUT: 600 mL    Total NET: -600 mL          LABS:                        11.0   14.00 )-----------( 212      ( 31 Mar 2024 04:40 )             33.8     03-31    143  |  112<H>  |  19  ----------------------------<  115<H>  3.4<L>   |  26  |  0.80    Ca    8.7      31 Mar 2024 04:40  Mg     1.9     03-31    TPro  7.8  /  Alb  3.2<L>  /  TBili  0.7  /  DBili  x   /  AST  14<L>  /  ALT  18  /  AlkPhos  91  03-29    PT/INR - ( 29 Mar 2024 20:00 )   PT: 12.0 sec;   INR: 1.03 ratio         PTT - ( 29 Mar 2024 20:00 )  PTT:28.8 sec  Urinalysis Basic - ( 31 Mar 2024 04:40 )    Color: x / Appearance: x / SG: x / pH: x  Gluc: 115 mg/dL / Ketone: x  / Bili: x / Urobili: x   Blood: x / Protein: x / Nitrite: x   Leuk Esterase: x / RBC: x / WBC x   Sq Epi: x / Non Sq Epi: x / Bacteria: x

## 2024-04-01 LAB
-  AMPICILLIN/SULBACTAM: SIGNIFICANT CHANGE UP
-  AMPICILLIN: SIGNIFICANT CHANGE UP
-  AZTREONAM: SIGNIFICANT CHANGE UP
-  CEFAZOLIN: SIGNIFICANT CHANGE UP
-  CEFEPIME: SIGNIFICANT CHANGE UP
-  CEFOXITIN: SIGNIFICANT CHANGE UP
-  CEFTRIAXONE: SIGNIFICANT CHANGE UP
-  CIPROFLOXACIN: SIGNIFICANT CHANGE UP
-  ERTAPENEM: SIGNIFICANT CHANGE UP
-  GENTAMICIN: SIGNIFICANT CHANGE UP
-  IMIPENEM: SIGNIFICANT CHANGE UP
-  LEVOFLOXACIN: SIGNIFICANT CHANGE UP
-  MEROPENEM: SIGNIFICANT CHANGE UP
-  PIPERACILLIN/TAZOBACTAM: SIGNIFICANT CHANGE UP
-  TOBRAMYCIN: SIGNIFICANT CHANGE UP
-  TRIMETHOPRIM/SULFAMETHOXAZOLE: SIGNIFICANT CHANGE UP
ANION GAP SERPL CALC-SCNC: 9 MMOL/L — SIGNIFICANT CHANGE UP (ref 5–17)
BUN SERPL-MCNC: 11 MG/DL — SIGNIFICANT CHANGE UP (ref 7–23)
CALCIUM SERPL-MCNC: 9.1 MG/DL — SIGNIFICANT CHANGE UP (ref 8.5–10.1)
CHLORIDE SERPL-SCNC: 109 MMOL/L — HIGH (ref 96–108)
CO2 SERPL-SCNC: 26 MMOL/L — SIGNIFICANT CHANGE UP (ref 22–31)
CREAT SERPL-MCNC: 0.71 MG/DL — SIGNIFICANT CHANGE UP (ref 0.5–1.3)
CULTURE RESULTS: ABNORMAL
CULTURE RESULTS: ABNORMAL
CULTURE RESULTS: SIGNIFICANT CHANGE UP
EGFR: 75 ML/MIN/1.73M2 — SIGNIFICANT CHANGE UP
GLUCOSE SERPL-MCNC: 138 MG/DL — HIGH (ref 70–99)
HCT VFR BLD CALC: 35.7 % — SIGNIFICANT CHANGE UP (ref 34.5–45)
HGB BLD-MCNC: 11.7 G/DL — SIGNIFICANT CHANGE UP (ref 11.5–15.5)
MAGNESIUM SERPL-MCNC: 1.8 MG/DL — SIGNIFICANT CHANGE UP (ref 1.6–2.6)
MCHC RBC-ENTMCNC: 29.5 PG — SIGNIFICANT CHANGE UP (ref 27–34)
MCHC RBC-ENTMCNC: 32.8 GM/DL — SIGNIFICANT CHANGE UP (ref 32–36)
MCV RBC AUTO: 90.2 FL — SIGNIFICANT CHANGE UP (ref 80–100)
METHOD TYPE: SIGNIFICANT CHANGE UP
NRBC # BLD: 0 /100 WBCS — SIGNIFICANT CHANGE UP (ref 0–0)
ORGANISM # SPEC MICROSCOPIC CNT: ABNORMAL
ORGANISM # SPEC MICROSCOPIC CNT: ABNORMAL
ORGANISM # SPEC MICROSCOPIC CNT: SIGNIFICANT CHANGE UP
PLATELET # BLD AUTO: 245 K/UL — SIGNIFICANT CHANGE UP (ref 150–400)
POTASSIUM SERPL-MCNC: 3.1 MMOL/L — LOW (ref 3.5–5.3)
POTASSIUM SERPL-SCNC: 3.1 MMOL/L — LOW (ref 3.5–5.3)
RBC # BLD: 3.96 M/UL — SIGNIFICANT CHANGE UP (ref 3.8–5.2)
RBC # FLD: 13.2 % — SIGNIFICANT CHANGE UP (ref 10.3–14.5)
SODIUM SERPL-SCNC: 144 MMOL/L — SIGNIFICANT CHANGE UP (ref 135–145)
SPECIMEN SOURCE: SIGNIFICANT CHANGE UP
WBC # BLD: 8.1 K/UL — SIGNIFICANT CHANGE UP (ref 3.8–10.5)
WBC # FLD AUTO: 8.1 K/UL — SIGNIFICANT CHANGE UP (ref 3.8–10.5)

## 2024-04-01 PROCEDURE — 99231 SBSQ HOSP IP/OBS SF/LOW 25: CPT

## 2024-04-01 RX ORDER — POTASSIUM CHLORIDE 20 MEQ
40 PACKET (EA) ORAL EVERY 4 HOURS
Refills: 0 | Status: COMPLETED | OUTPATIENT
Start: 2024-04-01 | End: 2024-04-01

## 2024-04-01 RX ADMIN — CEFTRIAXONE 100 MILLIGRAM(S): 500 INJECTION, POWDER, FOR SOLUTION INTRAMUSCULAR; INTRAVENOUS at 00:14

## 2024-04-01 RX ADMIN — Medication 62.5 MICROGRAM(S): at 05:30

## 2024-04-01 RX ADMIN — Medication 180 MILLIGRAM(S): at 05:32

## 2024-04-01 RX ADMIN — Medication 40 MILLIEQUIVALENT(S): at 14:22

## 2024-04-01 RX ADMIN — HEPARIN SODIUM 5000 UNIT(S): 5000 INJECTION INTRAVENOUS; SUBCUTANEOUS at 14:23

## 2024-04-01 RX ADMIN — HEPARIN SODIUM 5000 UNIT(S): 5000 INJECTION INTRAVENOUS; SUBCUTANEOUS at 05:32

## 2024-04-01 NOTE — PROGRESS NOTE ADULT - SUBJECTIVE AND OBJECTIVE BOX
Date of Service: 04-01-24 @ 13:01    Patient is a 89y old  Female who presents with a chief complaint of Retention of urine     (01 Apr 2024 11:55)      INTERVAL HPI/OVERNIGHT EVENTS: Patient seen and examined. NAD. No complaints.    Vital Signs Last 24 Hrs  T(C): 37 (01 Apr 2024 11:49), Max: 37 (01 Apr 2024 05:16)  T(F): 98.6 (01 Apr 2024 11:49), Max: 98.6 (01 Apr 2024 05:16)  HR: 89 (01 Apr 2024 11:49) (80 - 94)  BP: 131/66 (01 Apr 2024 11:49) (131/66 - 148/75)  BP(mean): --  RR: 19 (01 Apr 2024 11:49) (19 - 19)  SpO2: 95% (01 Apr 2024 11:49) (94% - 95%)    Parameters below as of 01 Apr 2024 11:49  Patient On (Oxygen Delivery Method): room air        04-01    144  |  109<H>  |  11  ----------------------------<  138<H>  3.1<L>   |  26  |  0.71    Ca    9.1      01 Apr 2024 08:05  Mg     1.8     04-01                            11.7   8.10  )-----------( 245      ( 01 Apr 2024 08:05 )             35.7       CAPILLARY BLOOD GLUCOSE        Urinalysis Basic - ( 01 Apr 2024 08:05 )    Color: x / Appearance: x / SG: x / pH: x  Gluc: 138 mg/dL / Ketone: x  / Bili: x / Urobili: x   Blood: x / Protein: x / Nitrite: x   Leuk Esterase: x / RBC: x / WBC x   Sq Epi: x / Non Sq Epi: x / Bacteria: x    Culture - Blood (03.29.24 @ 20:15)   - Meropenem: S <=1  - Piperacillin/Tazobactam: S <=8  - Tobramycin: S <=2  - Ciprofloxacin: S <=0.25  - Ertapenem: S <=0.5  - Gentamicin: S <=2  - Imipenem: S <=1  - Levofloxacin: S <=0.5  - Trimethoprim/Sulfamethoxazole: S <=0.5/9.5  - Escherichia coli: Detec  - Ampicillin: R >16 These ampicillin results predict results for amoxicillin  - Ampicillin/Sulbactam: S 8/4  - Aztreonam: S <=4  - Cefazolin: I 4  - Cefepime: S <=2  - Cefoxitin: S <=8  Gram Stain:   Growth in aerobic bottle: Gram Negative Rods   Growth in anaerobic bottle: Gram Negative Rods  - Ceftriaxone: S <=1  Specimen Source: .Blood Blood-Peripheral  Organism: Blood Culture PCR  Organism: Escherichia coli  Culture Results:   Growth in aerobic and anaerobic bottles: Escherichia coli   Direct identification is available within approximately 3-5   hours either by Blood Panel Multiplexed PCR or Direct   MALDI-TOF. Details: [email or web address]  Organism Identification: Blood Culture PCR   Escherichia coli  Method Type: PCR  Method Type: MICCulture - Urine (03.30.24 @ 02:52)   Specimen Source: Clean Catch Clean Catch (Midstream)  Culture Results:   >100,000 CFU/ml Escherichia coli      Historical Values  Culture - Urine (03.30.24 @ 02:52)   Specimen Source: Clean Catch Clean Catch (Midstream)  Culture Results:   >100,000 CFU/ml Escherichia coli          acetaminophen     Tablet .. 650 milliGRAM(s) Oral every 6 hours PRN  aluminum hydroxide/magnesium hydroxide/simethicone Suspension 30 milliLiter(s) Oral every 4 hours PRN  cefTRIAXone   IVPB 1000 milliGRAM(s) IV Intermittent every 24 hours  digoxin     Tablet 62.5 MICROGram(s) Oral daily  diltiazem    milliGRAM(s) Oral daily  heparin   Injectable 5000 Unit(s) SubCutaneous every 8 hours  melatonin 3 milliGRAM(s) Oral at bedtime PRN  ondansetron Injectable 4 milliGRAM(s) IV Push every 6 hours PRN  potassium chloride    Tablet ER 40 milliEquivalent(s) Oral every 4 hours  sodium chloride 0.9%. 1000 milliLiter(s) IV Continuous <Continuous>  tamsulosin 0.4 milliGRAM(s) Oral at bedtime              REVIEW OF SYSTEMS:  CONSTITUTIONAL: No fever, no weight loss, or no fatigue  NECK: No pain, no stiffness  RESPIRATORY: No cough, no wheezing, no chills, no hemoptysis, No shortness of breath  CARDIOVASCULAR: No chest pain, no palpitations, no dizziness, no leg swelling  GASTROINTESTINAL: No abdominal pain. No nausea, no vomiting, no hematemesis; No diarrhea, no constipation. No melena, no hematochezia.  GENITOURINARY: No dysuria, no frequency, no hematuria, no incontinence  NEUROLOGICAL: No headaches, no loss of strength, no numbness, no tremors  SKIN: No itching, no burning  MUSCULOSKELETAL: No joint pain, no swelling; No muscle, no back, no extremity pain  PSYCHIATRIC: No depression, no mood swings,   HEME/LYMPH: No easy bruising, no bleeding gums  ALLERY AND IMMUNOLOGIC: No hives       Consultant(s) Notes Reviewed:  [X] YES  [ ] NO    PHYSICAL EXAM:  GENERAL: NAD  HEAD:  Atraumatic, Normocephalic  EYES: EOMI, PERRLA, conjunctiva and sclera clear  ENMT: No tonsillar erythema, exudates, or enlargement; Moist mucous membranes  NECK: Supple, No JVD  NERVOUS SYSTEM:  Awake & alert  CHEST/LUNG: Clear to auscultation bilaterally; No rales, rhonchi, wheezing,  HEART: Regular rate and rhythm  ABDOMEN: Soft, Nontender, Nondistended; Bowel sounds present  EXTREMITIES:  No clubbing, cyanosis, or edema  LYMPH: No lymphadenopathy noted  SKIN: No rashes      Advanced care planning discussed with patient/family [X] YES   [ ] NO    Advanced care planning discussed with patient/family. Patient's health status was discussed. All appropriate changes have been made regarding patient's end-of-life care. Advanced care planning forms reviewed/discussed/completed.  20 minutes spent.

## 2024-04-01 NOTE — CARE COORDINATION ASSESSMENT. - OTHER PERTINENT DISCHARGE PLANNING INFORMATION:
SW and - Patient lives in main floor apartment ([address]) with 24/7 split shift Medicaid HHA's through the HCDP(nursing home diversion program).  @ [facility] is Ganesh fuller/ Mely [phone] and HHA is provided by [email or web address] and CM at [employer] is Antoni @ [phone], DC summary faxed to [phone]. SAE spoke with pts tere Diego with permission from pt- she reports pt having some increased difficulty at home, contemplaing TY upon DC. SW to email niece TY list, pt pending medical clearance. SW to follow.

## 2024-04-01 NOTE — PROGRESS NOTE ADULT - SUBJECTIVE AND OBJECTIVE BOX
[facility] DIVISION of INFECTIOUS DISEASE  José Miguel Vasquez MD PhD, Anita Banuelos MD, Ashley Pastrana MD, Lakeisha Goins MD, Hua Moran MD  and providing coverage with Carlos Eduardo Israel MD  Providing Infectious Disease Consultations at [facility], [facility], [place], [facility], [facility]'s    Office# [phone] to schedule follow up appointments  Answering Service for urgent calls or New Consults [phone]  Cell# to text for urgent issues José Miguel Vasquez [phone]     infectious diseases progress note:    JEREMIE PUGH is a 89y y. o. Female patient    Overnight and events of the last 24hrs reviewed    Allergies    No Known Allergies    Intolerances        ANTIBIOTICS/RELEVANT:  antimicrobials  cefTRIAXone   IVPB 1000 milliGRAM(s) IV Intermittent every 24 hours    immunologic:    OTHER:  acetaminophen     Tablet .. 650 milliGRAM(s) Oral every 6 hours PRN  aluminum hydroxide/magnesium hydroxide/simethicone Suspension 30 milliLiter(s) Oral every 4 hours PRN  digoxin     Tablet 62.5 MICROGram(s) Oral daily  diltiazem    milliGRAM(s) Oral daily  heparin   Injectable 5000 Unit(s) SubCutaneous every 8 hours  melatonin 3 milliGRAM(s) Oral at bedtime PRN  ondansetron Injectable 4 milliGRAM(s) IV Push every 6 hours PRN  potassium chloride    Tablet ER 40 milliEquivalent(s) Oral every 4 hours  sodium chloride 0.9%. 1000 milliLiter(s) IV Continuous <Continuous>  tamsulosin 0.4 milliGRAM(s) Oral at bedtime      Objective:  Vital Signs Last 24 Hrs  T(C): 37 (01 Apr 2024 11:49), Max: 37 (01 Apr 2024 05:16)  T(F): 98.6 (01 Apr 2024 11:49), Max: 98.6 (01 Apr 2024 05:16)  HR: 89 (01 Apr 2024 11:49) (80 - 94)  BP: 131/66 (01 Apr 2024 11:49) (131/66 - 148/75)  BP(mean): --  RR: 19 (01 Apr 2024 11:49) (19 - 19)  SpO2: 95% (01 Apr 2024 11:49) (94% - 95%)    Parameters below as of 01 Apr 2024 11:49  Patient On (Oxygen Delivery Method): room air        T(C): 37 (04-01-24 @ 11:49), Max: 37 (04-01-24 @ 05:16)  T(C): 37 (04-01-24 @ 11:49), Max: 38.7 (03-29-24 @ 23:26)  T(C): 37 (04-01-24 @ 11:49), Max: 38.7 (03-29-24 @ 23:26)    PHYSICAL EXAM:  HEENT: NC atraumatic  Neck: supple  Respiratory: no accessory muscle use, breathing comfortably  Cardiovascular: distant  Gastrointestinal: normal appearing, nondistended  Extremities: no clubbing, no cyanosis,        LABS:                          11.7   8.10  )-----------( 245      ( 01 Apr 2024 08:05 )             35.7       WBC  8.10 04-01 @ 08:05  14.00 03-31 @ 04:40  16.40 03-30 @ 04:43  14.61 03-29 @ 20:00      04-01    144  |  109<H>  |  11  ----------------------------<  138<H>  3.1<L>   |  26  |  0.71    Ca    9.1      01 Apr 2024 08:05  Mg     1.8     04-01        Creatinine: 0.71 mg/dL (04-01-24 @ 08:05)  Creatinine: 0.80 mg/dL (03-31-24 @ 04:40)  Creatinine: 1.00 mg/dL (03-30-24 @ 04:43)  Creatinine: 1.20 mg/dL (03-29-24 @ 20:00)        Urinalysis Basic - ( 01 Apr 2024 08:05 )    Color: x / Appearance: x / SG: x / pH: x  Gluc: 138 mg/dL / Ketone: x  / Bili: x / Urobili: x   Blood: x / Protein: x / Nitrite: x   Leuk Esterase: x / RBC: x / WBC x   Sq Epi: x / Non Sq Epi: x / Bacteria: x            INFLAMMATORY MARKERS      MICROBIOLOGY:    Culture - Blood (03.31.24 @ 04:40)    Specimen Source: .Blood Blood-Peripheral   Culture Results:   No growth at 24 hours    Culture - Blood (03.29.24 @ 20:15)    -  Escherichia coli: Detec   -  Ampicillin: R >16 These ampicillin results predict results for amoxicillin   -  Ampicillin/Sulbactam: S 8/4   -  Aztreonam: S <=4   -  Cefazolin: I 4   -  Cefepime: S <=2   -  Cefoxitin: S <=8   -  Ceftriaxone: S <=1   -  Ciprofloxacin: S <=0.25   -  Ertapenem: S <=0.5   -  Gentamicin: S <=2   -  Imipenem: S <=1   -  Levofloxacin: S <=0.5   -  Meropenem: S <=1   -  Piperacillin/Tazobactam: S <=8   -  Tobramycin: S <=2   -  Trimethoprim/Sulfamethoxazole: S <=0.5/9.5   Gram Stain:   Growth in aerobic bottle: Gram Negative Rods  Growth in anaerobic bottle: Gram Negative Rods   Specimen Source: .Blood Blood-Peripheral   Organism: Blood Culture PCR   Organism: Escherichia coli   Culture Results:   Growth in aerobic and anaerobic bottles: Escherichia coli  Direct identification is available within approximately 3-5  hours either by Blood Panel Multiplexed PCR or Direct  MALDI-TOF. Details: [email or web address]   Organism Identification: Blood Culture PCR  Escherichia coli   Method Type: PCR   Method Type: ALEC        RADIOLOGY & ADDITIONAL STUDIES:

## 2024-04-01 NOTE — DIETITIAN INITIAL EVALUATION ADULT - PERTINENT LABORATORY DATA
04-01    144  |  109<H>  |  11  ----------------------------<  138<H>  3.1<L>   |  26  |  0.71    Ca    9.1      01 Apr 2024 08:05  Mg     1.8     04-01    A1C with Estimated Average Glucose Result: 6.6 % (01-26-24 @ 16:40)

## 2024-04-01 NOTE — DIETITIAN INITIAL EVALUATION ADULT - ADD RECOMMEND
1) Continue regular diet as tolerated; honor food preferences as able to optimize po intake/tolerance  2) Recommend ensure plus HP daily (350kcal, 20gm protein); MD made aware diet rx pending verification  3) Electrolyte replacement prn  4) Monitor po intake, diet tolerance, weight trends, labs, GI function, skin integrity

## 2024-04-01 NOTE — DIETITIAN INITIAL EVALUATION ADULT - PERTINENT MEDS FT
MEDICATIONS  (STANDING):  cefTRIAXone   IVPB 1000 milliGRAM(s) IV Intermittent every 24 hours  digoxin     Tablet 62.5 MICROGram(s) Oral daily  diltiazem    milliGRAM(s) Oral daily  heparin   Injectable 5000 Unit(s) SubCutaneous every 8 hours  potassium chloride    Tablet ER 40 milliEquivalent(s) Oral every 4 hours  sodium chloride 0.9%. 1000 milliLiter(s) (50 mL/Hr) IV Continuous <Continuous>  tamsulosin 0.4 milliGRAM(s) Oral at bedtime    MEDICATIONS  (PRN):  acetaminophen     Tablet .. 650 milliGRAM(s) Oral every 6 hours PRN Temp greater or equal to 38C (100.4F), Mild Pain (1 - 3)  aluminum hydroxide/magnesium hydroxide/simethicone Suspension 30 milliLiter(s) Oral every 4 hours PRN Dyspepsia  melatonin 3 milliGRAM(s) Oral at bedtime PRN Insomnia  ondansetron Injectable 4 milliGRAM(s) IV Push every 6 hours PRN Nausea and/or Vomiting

## 2024-04-01 NOTE — PROGRESS NOTE ADULT - SUBJECTIVE AND OBJECTIVE BOX
UROLOGY DAILY PROGRESS NOTE:     Subjective: Patient seen and examined at bedside. No overnight events.       Objective:  Vital signs  T(F): , Max: 98.6 (04-01-24 @ 05:16)  HR: 94 (04-01-24 @ 05:16)  BP: 148/75 (04-01-24 @ 05:16)  SpO2: 94% (04-01-24 @ 05:16)  Wt(kg): --    I&O's Summary    31 Mar 2024 07:01  -  01 Apr 2024 07:00  --------------------------------------------------------  IN: 0 mL / OUT: 3500 mL / NET: -3500 mL        PHYSICAL EXAM:  HEENT: NCAT, airway patent  Respiratory: nonlabored breathing  Neuro: no focal deficits  : jones in place with clear yellow urine output     Labs:  03-31  14.00 / 33.8  /0.80                           11.0   14.00 )-----------( 212      ( 31 Mar 2024 04:40 )             33.8     03-31    143  |  112<H>  |  19  ----------------------------<  115<H>  3.4<L>   |  26  |  0.80    Ca    8.7      31 Mar 2024 04:40  Mg     1.9     03-31          Urine Cx:    ASSESSMENT:   89 y F presenting with weakness and AMS found to have a UTI and urinary retention s/p jones insertion    Plan:  - maintain jones  - I&O's  - trend labs and vitals   - continue IV abx

## 2024-04-01 NOTE — PROGRESS NOTE ADULT - PROBLEM SELECTOR PLAN 1
Admitted with acute UTI with E coli sepsis/bacteremia  Continue iv Rocephin  F/U culture data  ID f/u  Further work-up/management pending clinical course.
Admitted with acute UTI with E coli sepsis/bacteremia  Continue iv Rocephin  F/U culture data  ID f/u  Further work-up/management pending clinical course.

## 2024-04-01 NOTE — DIETITIAN INITIAL EVALUATION ADULT - NSICDXPASTMEDICALHX_GEN_ALL_CORE_FT
PAST MEDICAL HISTORY:  Colon cancer s/p resection 04    [place] (hard of hearing) right ear hearing aid    Hypertension     Pacemaker     Vertigo

## 2024-04-01 NOTE — CARE COORDINATION ASSESSMENT. - NSPASTMEDSURGHISTORY_GEN_ALL_CORE_FT
PAST MEDICAL & SURGICAL HISTORY:  Vertigo      Pacemaker      Hypertension      [place] (hard of hearing)  right ear hearing aid      Colon cancer  s/p resection 04      History of colon resection  2004

## 2024-04-01 NOTE — CARE COORDINATION ASSESSMENT. - NSCAREPROVIDERS_GEN_ALL_CORE_FT
CARE PROVIDERS:  Accepting Physician: Jefry Gurrola  Administration: Randolph Collier  Administration: Pilo Siddiqui  Admitting: Jefry Gurrola  Attending: Jefry Gurrola  Case Management: Bing Jaquez  Consultant: David Anthony  Consultant: Mary Salazar  Consultant: Shiv Rose  Consultant: Lakeisha Goins  Covering Team: Jaspal De Paz  ED Attending: Domenico Rios  ED Nurse: Norm Martinez  Nurse: Lay Phillips  Nurse: Aleida Narayanan  Nurse: Adela Park  Nurse: Adenike Daniels  Nurse: Lenny Acevedo  Ordered: Doctor, Unknown  Ordered: ADM, User  Outpatient Provider: Iban Rosado  Outpatient Provider: Trent Sanchez  Outpatient Provider: Micheal Anaya  Override: Rosario Peter  Override: Misael Piedra  Override: Tushar Gonsalez  Override: Sara Radford  Override: Maddy Bravo  Override: Rona Gallegos  PCA/Nursing Assistant: Maddy Bravo  Primary Team: Aleida Aguiar  Primary Team: Marielle Ovalle  Primary Team: Amy Ocampo  Registered Dietitian: Nerissa Huerta  : Winsome Bullard  Student: Renee Lee

## 2024-04-01 NOTE — DIETITIAN INITIAL EVALUATION ADULT - OTHER INFO
Patient is a "89 yo F with past medical history of vertigo, ICD in place, hypertension, mild dementia is presenting from home with concern for weakness and altered mental status."    Visited pt at bedside this am. Pt alert, forgetful at times during visit. Reports fair appetite/intake. Consumes small portion meals. Reports minimal po intake of breakfast meal this am. PO intakes 25-50% per nursing documentation. Tolerating diet well. No food allergies. Denies chewing/swallowing difficulties. CBW on admission 120#. No edema noted. Skin: stage I to BL heels. Pt currently on regular diet. Pt agreeable to receive ensure plus HP daily (chocolate flavor) for additional kcal/protein and to supplement suspected variable po intake. Recommend assistance/encouragement at meal times as needed. RD remains available and will continue to follow-up.

## 2024-04-02 ENCOUNTER — TRANSCRIPTION ENCOUNTER (OUTPATIENT)
Age: 89
End: 2024-04-02

## 2024-04-02 VITALS
HEART RATE: 59 BPM | OXYGEN SATURATION: 93 % | RESPIRATION RATE: 18 BRPM | SYSTOLIC BLOOD PRESSURE: 120 MMHG | TEMPERATURE: 98 F | DIASTOLIC BLOOD PRESSURE: 62 MMHG

## 2024-04-02 LAB
ANION GAP SERPL CALC-SCNC: 9 MMOL/L — SIGNIFICANT CHANGE UP (ref 5–17)
BUN SERPL-MCNC: 12 MG/DL — SIGNIFICANT CHANGE UP (ref 7–23)
CALCIUM SERPL-MCNC: 8.6 MG/DL — SIGNIFICANT CHANGE UP (ref 8.5–10.1)
CHLORIDE SERPL-SCNC: 110 MMOL/L — HIGH (ref 96–108)
CO2 SERPL-SCNC: 26 MMOL/L — SIGNIFICANT CHANGE UP (ref 22–31)
CREAT SERPL-MCNC: 0.71 MG/DL — SIGNIFICANT CHANGE UP (ref 0.5–1.3)
EGFR: 75 ML/MIN/1.73M2 — SIGNIFICANT CHANGE UP
GLUCOSE SERPL-MCNC: 105 MG/DL — HIGH (ref 70–99)
HCT VFR BLD CALC: 34.3 % — LOW (ref 34.5–45)
HGB BLD-MCNC: 11.1 G/DL — LOW (ref 11.5–15.5)
MAGNESIUM SERPL-MCNC: 2 MG/DL — SIGNIFICANT CHANGE UP (ref 1.6–2.6)
MCHC RBC-ENTMCNC: 29.3 PG — SIGNIFICANT CHANGE UP (ref 27–34)
MCHC RBC-ENTMCNC: 32.4 GM/DL — SIGNIFICANT CHANGE UP (ref 32–36)
MCV RBC AUTO: 90.5 FL — SIGNIFICANT CHANGE UP (ref 80–100)
NRBC # BLD: 0 /100 WBCS — SIGNIFICANT CHANGE UP (ref 0–0)
PLATELET # BLD AUTO: 238 K/UL — SIGNIFICANT CHANGE UP (ref 150–400)
POTASSIUM SERPL-MCNC: 3.6 MMOL/L — SIGNIFICANT CHANGE UP (ref 3.5–5.3)
POTASSIUM SERPL-SCNC: 3.6 MMOL/L — SIGNIFICANT CHANGE UP (ref 3.5–5.3)
RBC # BLD: 3.79 M/UL — LOW (ref 3.8–5.2)
RBC # FLD: 13.3 % — SIGNIFICANT CHANGE UP (ref 10.3–14.5)
SODIUM SERPL-SCNC: 145 MMOL/L — SIGNIFICANT CHANGE UP (ref 135–145)
WBC # BLD: 8.24 K/UL — SIGNIFICANT CHANGE UP (ref 3.8–10.5)
WBC # FLD AUTO: 8.24 K/UL — SIGNIFICANT CHANGE UP (ref 3.8–10.5)

## 2024-04-02 PROCEDURE — 83735 ASSAY OF MAGNESIUM: CPT

## 2024-04-02 PROCEDURE — 85610 PROTHROMBIN TIME: CPT

## 2024-04-02 PROCEDURE — 87150 DNA/RNA AMPLIFIED PROBE: CPT

## 2024-04-02 PROCEDURE — 99285 EMERGENCY DEPT VISIT HI MDM: CPT

## 2024-04-02 PROCEDURE — 93005 ELECTROCARDIOGRAM TRACING: CPT

## 2024-04-02 PROCEDURE — 74176 CT ABD & PELVIS W/O CONTRAST: CPT | Mod: MC

## 2024-04-02 PROCEDURE — 85027 COMPLETE CBC AUTOMATED: CPT

## 2024-04-02 PROCEDURE — 83605 ASSAY OF LACTIC ACID: CPT

## 2024-04-02 PROCEDURE — 80053 COMPREHEN METABOLIC PANEL: CPT

## 2024-04-02 PROCEDURE — 87637 SARSCOV2&INF A&B&RSV AMP PRB: CPT

## 2024-04-02 PROCEDURE — 87186 SC STD MICRODIL/AGAR DIL: CPT

## 2024-04-02 PROCEDURE — 87040 BLOOD CULTURE FOR BACTERIA: CPT

## 2024-04-02 PROCEDURE — 87086 URINE CULTURE/COLONY COUNT: CPT

## 2024-04-02 PROCEDURE — 97162 PT EVAL MOD COMPLEX 30 MIN: CPT

## 2024-04-02 PROCEDURE — 87077 CULTURE AEROBIC IDENTIFY: CPT

## 2024-04-02 PROCEDURE — 85730 THROMBOPLASTIN TIME PARTIAL: CPT

## 2024-04-02 PROCEDURE — 84145 PROCALCITONIN (PCT): CPT

## 2024-04-02 PROCEDURE — 85025 COMPLETE CBC W/AUTO DIFF WBC: CPT

## 2024-04-02 PROCEDURE — 80048 BASIC METABOLIC PNL TOTAL CA: CPT

## 2024-04-02 PROCEDURE — 36415 COLL VENOUS BLD VENIPUNCTURE: CPT

## 2024-04-02 PROCEDURE — 81001 URINALYSIS AUTO W/SCOPE: CPT

## 2024-04-02 RX ORDER — CEFPODOXIME PROXETIL 100 MG
1 TABLET ORAL
Qty: 0 | Refills: 0 | DISCHARGE
Start: 2024-04-02

## 2024-04-02 RX ORDER — DILTIAZEM HCL 120 MG
1 CAPSULE, EXT RELEASE 24 HR ORAL
Qty: 0 | Refills: 0 | DISCHARGE
Start: 2024-04-02

## 2024-04-02 RX ORDER — DILTIAZEM HCL 120 MG
1 CAPSULE, EXT RELEASE 24 HR ORAL
Refills: 0 | DISCHARGE

## 2024-04-02 RX ORDER — CEFPODOXIME PROXETIL 100 MG
200 TABLET ORAL EVERY 12 HOURS
Refills: 0 | Status: DISCONTINUED | OUTPATIENT
Start: 2024-04-03 | End: 2024-04-02

## 2024-04-02 RX ORDER — DIGOXIN 250 MCG
1 TABLET ORAL
Qty: 0 | Refills: 0 | DISCHARGE
Start: 2024-04-02

## 2024-04-02 RX ORDER — TAMSULOSIN HYDROCHLORIDE 0.4 MG/1
1 CAPSULE ORAL
Qty: 0 | Refills: 0 | DISCHARGE
Start: 2024-04-02

## 2024-04-02 RX ADMIN — CEFTRIAXONE 100 MILLIGRAM(S): 500 INJECTION, POWDER, FOR SOLUTION INTRAMUSCULAR; INTRAVENOUS at 00:26

## 2024-04-02 RX ADMIN — HEPARIN SODIUM 5000 UNIT(S): 5000 INJECTION INTRAVENOUS; SUBCUTANEOUS at 05:30

## 2024-04-02 RX ADMIN — Medication 180 MILLIGRAM(S): at 05:30

## 2024-04-02 RX ADMIN — SODIUM CHLORIDE 50 MILLILITER(S): 9 INJECTION INTRAMUSCULAR; INTRAVENOUS; SUBCUTANEOUS at 00:27

## 2024-04-02 RX ADMIN — Medication 62.5 MICROGRAM(S): at 05:30

## 2024-04-02 NOTE — DISCHARGE NOTE PROVIDER - NSDCMRMEDTOKEN_GEN_ALL_CORE_FT
cefpodoxime 200 mg oral tablet: 1 tab(s) orally every 12 hours stop after 5 days  digoxin 62.5 mcg (0.0625 mg) oral tablet: 1 tab(s) orally once a day  dilTIAZem 180 mg/24 hours oral capsule, extended release: 1 cap(s) orally once a day  tamsulosin 0.4 mg oral capsule: 1 cap(s) orally once a day (at bedtime)

## 2024-04-02 NOTE — PROGRESS NOTE ADULT - ASSESSMENT
89 y F presenting with weakness and AMS found to have a UTI and urinary retention    Plan:  - maintain jones  - I&O's  - trend labs and vitals   - continue IV abx 
Patient is a 89F with past medical history of vertigo, ICD in place, hypertension, mild dementia is presenting from home with concern for weakness and altered mental status. Fever, leukocytosis, end organ disfunction (OVIDIO), AMS    Severe Sepsis due to E.coli bacteremia due to Pyleonephritis  - AMS with fever noted in ER with leukocytosis   - UA with pyuria -- 20WBC with clumps of WBCs, large LE, negative nitrites, many bacteria, E coli - rel pan sensitive  -  Ceftriaxone: S <=1   - Bcx with E.coli x2  - rel pan sensitive  -  Ceftriaxone: S <=1   Rpt Culture - Blood (03.31.24 @ 04:40)-NGTD   - CTAP with moderate b/l hydronephrosis to level of distended trabeculated bladder; diverticulosis without diverticulitis   - Urology following   Ceftriaxone 1g IV Q24 (started 3/29 late) -continue for now w recs to follow on timing of change to PO    Thank you for consulting us and involving us in the management of this most interesting and challenging case.  We will follow along in the care of this patient. Please call us at [phone] or text me directly on my cell# at [phone] with any concerns.    
Patient is a 89F with past medical history of vertigo, ICD in place, hypertension, mild dementia is presenting from home with concern for weakness and altered mental status.     Sepsis due to E.coli bacteremia likely  source  - AMS with fever noted in ER with leukocytosis  - s/p jones placement, urine much less purulent now   - UA with pyuria -- 20WBC with clumps of WBCs, large LE, negative nitrites, many bacteria   - Bcx with E.coli x2  - CTAP with moderate b/l hydronephrosis to level of distended trabeculated bladder; diverticulosis without diverticulitis   - Urology following     Recommendations:  Follow Bcx for E.coli sensitivities  Follow Ucx - in process   Follow repeat Bcx from 3/31 - in process x2   Continue on ceftriaxone 1g IV Q24 for now  Jones care  Monitor temps/WBC    D/w tere at bedside, RN  Lakiesha Goins M.D.  [facility], Division of Infectious Diseases  [phone]  After 5pm on weekdays and all day on weekends - please call [phone]
Patient is a 89F with past medical history of vertigo, ICD in place, hypertension, mild dementia is presenting from home with concern for weakness and altered mental status. Fever, leukocytosis, end organ disfunction (OVIDIO), AMS    Severe Sepsis due to E.coli bacteremia due to Pyleonephritis  - AMS with fever noted in ER with leukocytosis   - UA with pyuria -- 20WBC with clumps of WBCs, large LE, negative nitrites, many bacteria, mixed growth  - Bcx with E.coli x2  - rel pan sensitive  -  Ceftriaxone: S <=1,  -  Cefazolin: I 4   Rpt Culture - Blood (03.31.24 @ 04:40)-NGTD   - CTAP with moderate b/l hydronephrosis to level of distended trabeculated bladder; diverticulosis without diverticulitis   - Urology following   Ceftriaxone 1g IV Q24 (started 3/29 late) -got dose early am 4/2 4/2-can change to Vantin 200mg PO BID with last day 4/7    Thank you for consulting us and involving us in the management of this most interesting and challenging case.  We will follow along in the care of this patient. Please call us at [phone] or text me directly on my cell# at [phone] with any concerns.

## 2024-04-02 NOTE — PROGRESS NOTE ADULT - PROVIDER SPECIALTY LIST ADULT
Infectious Disease
Internal Medicine
Infectious Disease
Urology
Infectious Disease
Internal Medicine

## 2024-04-02 NOTE — PROGRESS NOTE ADULT - SUBJECTIVE AND OBJECTIVE BOX
SUBJECTIVE:  Patient seen and examined at bedside. No overnight events. Patient reports no new complaints at this time. As per nursing staff report, pt w/ wt diaper however PVRs elevated  Patient denies any fever, chills, chest pain, shortness of breath, nausea, vomiting, or urinary complaints.    VITALS  Vital Signs Last 24 Hrs  T(C): 36.7 (02 Apr 2024 11:03), Max: 37 (01 Apr 2024 11:49)  T(F): 98 (02 Apr 2024 11:03), Max: 98.6 (01 Apr 2024 11:49)  HR: 59 (02 Apr 2024 11:03) (59 - 97)  BP: 120/62 (02 Apr 2024 11:03) (120/62 - 154/67)  BP(mean): --  RR: 18 (02 Apr 2024 11:03) (18 - 19)  SpO2: 93% (02 Apr 2024 11:03) (93% - 96%)    Parameters below as of 02 Apr 2024 11:03  Patient On (Oxygen Delivery Method): room air        PHYSICAL EXAM  GENERAL:  Well-nourished, well-developed Female lying comfortably in bed in [facility].  HEENT:  NC/AT. Sclera white. Mucous membranes moist.  CARDIO:  Regular rate and rhythm.  No murmur, gallop or rub appreciated.  RESPIRATORY:  Nonlabored breathing, no accessory muscle use. Lungs clear to auscultation bilaterally.  No wheezing, rales or rhonchi appreciated.  ABDOMEN:  Soft, nondistended, nontender. BS appreciated on auscultation.  No rebound tenderness or guarding.  EXTREMITIES: No calf tenderness bilaterally.  SKIN:  No jaundice, pallor, or cyanosis  NEURO:  A&O x 3    INTAKE & OUTPUT  I&O's Summary    I&O's Detail      MEDICATIONS  MEDICATIONS  (STANDING):  digoxin     Tablet 62.5 MICROGram(s) Oral daily  diltiazem    milliGRAM(s) Oral daily  heparin   Injectable 5000 Unit(s) SubCutaneous every 8 hours  tamsulosin 0.4 milliGRAM(s) Oral at bedtime    MEDICATIONS  (PRN):  acetaminophen     Tablet .. 650 milliGRAM(s) Oral every 6 hours PRN Temp greater or equal to 38C (100.4F), Mild Pain (1 - 3)  aluminum hydroxide/magnesium hydroxide/simethicone Suspension 30 milliLiter(s) Oral every 4 hours PRN Dyspepsia  melatonin 3 milliGRAM(s) Oral at bedtime PRN Insomnia  ondansetron Injectable 4 milliGRAM(s) IV Push every 6 hours PRN Nausea and/or Vomiting      LABS:                        11.1   8.24  )-----------( 238      ( 02 Apr 2024 07:55 )             34.3     04-01    144  |  109<H>  |  11  ----------------------------<  138<H>  3.1<L>   |  26  |  0.71    Ca    9.1      01 Apr 2024 08:05  Mg     1.8     04-01          Culture - Blood (collected 31 Mar 2024 04:40)  Source: .Blood Blood-Peripheral  Preliminary Report (01 Apr 2024 13:02):    No growth at 24 hours    Culture - Blood (collected 31 Mar 2024 04:40)  Source: .Blood Blood-Peripheral  Preliminary Report (01 Apr 2024 13:02):    No growth at 24 hours        RADIOLOGY & ADDITIONAL STUDIES:    ASSESSMENT & PLAN   89y Female POD# s/p    - Continue diet  - Continue antibiotics  - Serial abdominal exams  - Is & Os  - DVT prophylaxis with  - OOB, pain control  - Incentive spirometry  - Follow up AM labs  - Case to be discussed with   SUBJECTIVE:  Patient seen and examined at bedside. No overnight events. Patient reports no new complaints at this time. As per nursing staff report, pt w/ wet diaper however bladder scans elevated. Last bladder scan recorded 493cc at 0900 this morning.  Patient denies any fever, chills, chest pain, shortness of breath, nausea, vomiting, or urinary complaints.    VITALS  Vital Signs Last 24 Hrs  T(C): 36.7 (02 Apr 2024 11:03), Max: 37 (01 Apr 2024 11:49)  T(F): 98 (02 Apr 2024 11:03), Max: 98.6 (01 Apr 2024 11:49)  HR: 59 (02 Apr 2024 11:03) (59 - 97)  BP: 120/62 (02 Apr 2024 11:03) (120/62 - 154/67)  BP(mean): --  RR: 18 (02 Apr 2024 11:03) (18 - 19)  SpO2: 93% (02 Apr 2024 11:03) (93% - 96%)    Parameters below as of 02 Apr 2024 11:03  Patient On (Oxygen Delivery Method): room air    PHYSICAL EXAM  GENERAL:  Well-developed Female resting comfortably in bed in [facility].  HEENT:  NC/AT. Sclera white. Mucous membranes moist.  ABDOMEN:  Soft, nondistended, nontender; No rebound tenderness or guarding.  SKIN:  No jaundice, pallor, or cyanosis  NEURO:  A&O x 3    MEDICATIONS  MEDICATIONS  (STANDING):  digoxin     Tablet 62.5 MICROGram(s) Oral daily  diltiazem    milliGRAM(s) Oral daily  heparin   Injectable 5000 Unit(s) SubCutaneous every 8 hours  tamsulosin 0.4 milliGRAM(s) Oral at bedtime    MEDICATIONS  (PRN):  acetaminophen     Tablet .. 650 milliGRAM(s) Oral every 6 hours PRN Temp greater or equal to 38C (100.4F), Mild Pain (1 - 3)  aluminum hydroxide/magnesium hydroxide/simethicone Suspension 30 milliLiter(s) Oral every 4 hours PRN Dyspepsia  melatonin 3 milliGRAM(s) Oral at bedtime PRN Insomnia  ondansetron Injectable 4 milliGRAM(s) IV Push every 6 hours PRN Nausea and/or Vomiting    LABS:                      11.1   8.24  )-----------( 238      ( 02 Apr 2024 07:55 )             34.3     04-01    144  |  109<H>  |  11  ----------------------------<  138<H>  3.1<L>   |  26  |  0.71    Ca    9.1      01 Apr 2024 08:05  Mg     1.8     04-01    Culture - Blood (collected 31 Mar 2024 04:40)  Source: .Blood Blood-Peripheral  Preliminary Report (01 Apr 2024 13:02):    No growth at 24 hours    Culture - Blood (collected 31 Mar 2024 04:40)  Source: .Blood Blood-Peripheral  Preliminary Report (01 Apr 2024 13:02):    No growth at 24 hours    ASSESSMENT & PLAN  89 y F presenting with weakness and AMS found to have a UTI and urinary retention. s/p jones insertion on admission, since removed yesterday around 2 PM 04/02. Pt now retaining 493 cc urine on bladder scan this AM.  AFVSS. Resolved leukocytosis noted.    - Jones placement ordered; outpt TOV in office  - Continue Flomax upon discharge  - ID recs appreciated; Vantin w/ last day 4/7  - Is & Os- Jones  - Otherwise urologically stable at present, continue conservative management.

## 2024-04-02 NOTE — SOCIAL WORK PROGRESS NOTE - NSSWPROGRESSNOTE_GEN_ALL_CORE
BHARGAV requested. spoke with pts tere Conner [phone]. as requested TY list emailed to pts tere for review [email or web address]. sw awaiting TY choices.

## 2024-04-02 NOTE — PROVIDER CONTACT NOTE (OTHER) - ACTION/TREATMENT ORDERED:
PA notified.  Per provider, he will let the day team notified
provider notified. Per provider, redo bladder scan around 0500AM

## 2024-04-02 NOTE — SOCIAL WORK PROGRESS NOTE - NSSWPROGRESSNOTE_GEN_ALL_CORE
pt has been medically accepted at [facility], available bed confirmed for today. robbie spoke with pts tere Diego, aware and in agreement with plans for transition today to [facility] for EDUARDO [place] coordinated for 5 pm.

## 2024-04-02 NOTE — DISCHARGE NOTE PROVIDER - PROVIDER TOKENS
PROVIDER:[TOKEN:[[id number]],FOLLOWUP:[1 week],ESTABLISHEDPATIENT:[T]],PROVIDER:[TOKEN:[[id number]],FOLLOWUP:[1 week],ESTABLISHEDPATIENT:[T]]

## 2024-04-02 NOTE — PROGRESS NOTE ADULT - SUBJECTIVE AND OBJECTIVE BOX
[facility] DIVISION of INFECTIOUS DISEASE  José Miguel Vasquez MD PhD, Anita Banuelos MD, Ashley Pastrana MD, Lakeisha Goins MD, Hua Moran MD  and providing coverage with Carlos Eduardo Israel MD  Providing Infectious Disease Consultations at [facility], [facility], [place], [facility], [facility]'s    Office# [phone] to schedule follow up appointments  Answering Service for urgent calls or New Consults [phone]  Cell# to text for urgent issues José Miguel Vasquez [phone]     infectious diseases progress note:    JEREMIE PUGH is a 89y y. o. Female patient    Overnight and events of the last 24hrs reviewed    Allergies    No Known Allergies    Intolerances        ANTIBIOTICS/RELEVANT:  antimicrobials  cefTRIAXone   IVPB 1000 milliGRAM(s) IV Intermittent every 24 hours    immunologic:    OTHER:  acetaminophen     Tablet .. 650 milliGRAM(s) Oral every 6 hours PRN  aluminum hydroxide/magnesium hydroxide/simethicone Suspension 30 milliLiter(s) Oral every 4 hours PRN  digoxin     Tablet 62.5 MICROGram(s) Oral daily  diltiazem    milliGRAM(s) Oral daily  heparin   Injectable 5000 Unit(s) SubCutaneous every 8 hours  melatonin 3 milliGRAM(s) Oral at bedtime PRN  ondansetron Injectable 4 milliGRAM(s) IV Push every 6 hours PRN  tamsulosin 0.4 milliGRAM(s) Oral at bedtime      Objective:  Vital Signs Last 24 Hrs  T(C): 36.7 (02 Apr 2024 05:19), Max: 37 (01 Apr 2024 11:49)  T(F): 98.1 (02 Apr 2024 05:19), Max: 98.6 (01 Apr 2024 11:49)  HR: 69 (02 Apr 2024 05:19) (69 - 97)  BP: 154/67 (02 Apr 2024 05:19) (131/66 - 154/67)  BP(mean): --  RR: 18 (02 Apr 2024 05:19) (18 - 19)  SpO2: 95% (02 Apr 2024 05:19) (95% - 96%)    Parameters below as of 02 Apr 2024 05:19  Patient On (Oxygen Delivery Method): room air        T(C): 36.7 (04-02-24 @ 05:19), Max: 37 (04-01-24 @ 05:16)  T(C): 36.7 (04-02-24 @ 05:19), Max: 37 (04-01-24 @ 05:16)  T(C): 36.7 (04-02-24 @ 05:19), Max: 38.7 (03-29-24 @ 23:26)    PHYSICAL EXAM:  HEENT: NC atraumatic  Neck: supple  Respiratory: no accessory muscle use, breathing comfortably  Cardiovascular: distant  Gastrointestinal: normal appearing, nondistended  Extremities: no clubbing, no cyanosis,        LABS:                          11.7   8.10  )-----------( 245      ( 01 Apr 2024 08:05 )             35.7       WBC  8.10 04-01 @ 08:05  14.00 03-31 @ 04:40  16.40 03-30 @ 04:43  14.61 03-29 @ 20:00      04-01    144  |  109<H>  |  11  ----------------------------<  138<H>  3.1<L>   |  26  |  0.71    Ca    9.1      01 Apr 2024 08:05  Mg     1.8     04-01        Creatinine: 0.71 mg/dL (04-01-24 @ 08:05)  Creatinine: 0.80 mg/dL (03-31-24 @ 04:40)  Creatinine: 1.00 mg/dL (03-30-24 @ 04:43)  Creatinine: 1.20 mg/dL (03-29-24 @ 20:00)    eGFR: 75 mL/min/1.73m2 (04.01.24 @ 08:05)    MICROBIOLOGY:    Culture - Blood (03.31.24 @ 04:40)    Specimen Source: .Blood Blood-Peripheral   Culture Results:   No growth at 24 hours    Culture - Blood (03.29.24 @ 20:15)    -  Escherichia coli: Detec   -  Ampicillin: R >16 These ampicillin results predict results for amoxicillin   -  Ampicillin/Sulbactam: S 8/4   -  Aztreonam: S <=4   -  Cefazolin: I 4   -  Cefepime: S <=2   -  Cefoxitin: S <=8   -  Ceftriaxone: S <=1   -  Ciprofloxacin: S <=0.25   -  Ertapenem: S <=0.5   -  Gentamicin: S <=2   -  Imipenem: S <=1   -  Levofloxacin: S <=0.5   -  Meropenem: S <=1   -  Piperacillin/Tazobactam: S <=8   -  Tobramycin: S <=2   -  Trimethoprim/Sulfamethoxazole: S <=0.5/9.5   Gram Stain:   Growth in aerobic bottle: Gram Negative Rods  Growth in anaerobic bottle: Gram Negative Rods   Specimen Source: .Blood Blood-Peripheral   Organism: Blood Culture PCR   Organism: Escherichia coli   Culture Results:   Growth in aerobic and anaerobic bottles: Escherichia coli  Direct identification is available within approximately 3-5  hours either by Blood Panel Multiplexed PCR or Direct  MALDI-TOF. Details: [email or web address]   Organism Identification: Blood Culture PCR  Escherichia coli   Method Type: ALEC   Method Type: PCR        RADIOLOGY & ADDITIONAL STUDIES:

## 2024-04-02 NOTE — DISCHARGE NOTE PROVIDER - HOSPITAL COURSE
This is a 89F with past medical history of vertigo, ICD in place, hypertension, mild dementia is presenting from home with concern for weakness and altered mental status.  Family states that for the last two days they have noted patient to be slightly different compared to her baseline.  No falls, fevers, or pain.  They state the last time this happened patient developed a urinary tract infection and that is why family came to the ER.  Patient did have 2 episodes of vomiting over the past day.  No abdominal pain.  No change in bowel movements. On the previous admission in January, patient had presented to the ER with fever and urinary retention. She was treated with iv abx and initially jones was placed. She was able to void prior to discharge and was discharged home without a jones. This time she was found have a fever of 101.6 in the ER and to be in urinary retention as well. Jones was placed in the ER.    Patient admitted with severe sepsis with E. coli bacteremia 2/2 acute pyelonephritis  Also had urinary retention -- had jones placed and started on Flomax  Treated with iv abx  Improved  Repeat cultures NTD  Going home with jones with TOV as outpatient    >35 minutes spent on discharge This is a 89F with past medical history of vertigo, ICD in place, hypertension, mild dementia is presenting from home with concern for weakness and altered mental status.  Family states that for the last two days they have noted patient to be slightly different compared to her baseline.  No falls, fevers, or pain.  They state the last time this happened patient developed a urinary tract infection and that is why family came to the ER.  Patient did have 2 episodes of vomiting over the past day.  No abdominal pain.  No change in bowel movements. On the previous admission in January, patient had presented to the ER with fever and urinary retention. She was treated with iv abx and initially jones was placed. She was able to void prior to discharge and was discharged home without a jones. This time she was found have a fever of 101.6 in the ER and to be in urinary retention as well. Jones was placed in the ER.    Patient admitted with severe sepsis with E. coli bacteremia 2/2 acute pyelonephritis  Patient had AMS 2/2 to metabolic encephalopathy  Also had urinary retention -- had jones placed and started on Flomax  Treated with iv abx  Improved  Repeat cultures NTD  Going home with jones with TOV as outpatient    >35 minutes spent on discharge

## 2024-04-02 NOTE — DISCHARGE NOTE NURSING/CASE MANAGEMENT/SOCIAL WORK - PATIENT PORTAL LINK FT
You can access the FollowMyHealth Patient Portal offered by [facility] by registering at the following website: [email or web address]. By joining [facility]’s FollowMyHealth portal, you will also be able to view your health information using other applications (apps) compatible with our system.

## 2024-04-02 NOTE — DISCHARGE NOTE PROVIDER - CARE PROVIDER_API CALL
Micheal Anaya  Internal Medicine  [address], [address]  [place] [address]  Phone: [phone]  Fax: [phone]  Established Patient  Follow Up Time: 1 week    Shiv Rose  Urology  [address] [place] [address]  Phone: [phone]  Fax: [phone]  Established Patient  Follow Up Time: 1 week

## 2024-04-02 NOTE — DISCHARGE NOTE NURSING/CASE MANAGEMENT/SOCIAL WORK - NSDPFAC_GEN_ALL_CORE
"I am sorry about the schedule.  Sandhya IS scheduled for 40 minutes.  However it appears that slot was double booked at noon today as it has the -1 and a central scheduler scheduled someone in there today over booking it.    Patient did not follow up with [facility] as discussed.  Spoke with her again and dicussed follow up with [facility] regarding symptoms.  She seems to not want to go [facility] for this and states \"I was told that I could see who I wanted for things\" Education on the specialty she is following with for ID and with the symptoms she is experiencing that the Specialty needs to know of the symptoms she is experiencing.      States still dizzy however does not feel as confused today.  She will now call [facility] and call back asking for me to give me an update on their recommendations.  Luly Hartmann RN - Triage  [facility]    " Carlos

## 2024-04-02 NOTE — DISCHARGE NOTE PROVIDER - NSDCCPCAREPLAN_GEN_ALL_CORE_FT
PRINCIPAL DISCHARGE DIAGNOSIS  Diagnosis: E. coli bacteremia  Assessment and Plan of Treatment: Finish course of antibiotics.  Follow-up with your primary care doctor within 1 week.        SECONDARY DISCHARGE DIAGNOSES  Diagnosis: Urinary retention  Assessment and Plan of Treatment: Continue flomax and jones  Trial of void after seen by urology

## 2024-04-02 NOTE — DISCHARGE NOTE NURSING/CASE MANAGEMENT/SOCIAL WORK - NSDCPEFALRISK_GEN_ALL_CORE
For information on Fall & Injury Prevention, visit: [email or web address] OR  [email or web address] OR  https://www.cdc.gov/steadi/patient.html

## 2024-04-02 NOTE — PATIENT CHOICE NOTE. - NSPTCHOICESTATE_GEN_ALL_CORE
I have met with the patient and/or caregiver to discuss discharge goals and treatment plan. Patient and/or caregiver also provided with instructions on accessing the CMS Compare websites for additional information related to Post Acute Provider quality and resource use measures to assist them in evaluation of the providers and in selecting their post-acute provider of choice. Patient and caregiver were informed of the facilities that are owned and/or operated by [facility]. I have discussed with the patient the availability of in-network facilities and providers. Patient and caregiver provided with a list of post-acute providers whose services are appropriate to the discharge plans and patient needs.     For patient requiring durable medical equipment, patient and/or caregiver were informed that they have the right to request who provides the required equipment.
I have met with the patient and/or caregiver to discuss discharge goals and treatment plan. Patient and/or caregiver also provided with instructions on accessing the CMS Compare websites for additional information related to Post Acute Provider quality and resource use measures to assist them in evaluation of the providers and in selecting their post-acute provider of choice. Patient and caregiver were informed of the facilities that are owned and/or operated by [facility]. I have discussed with the patient the availability of in-network facilities and providers. Patient and caregiver provided with a list of post-acute providers whose services are appropriate to the discharge plans and patient needs.     For patient requiring durable medical equipment, patient and/or caregiver were informed that they have the right to request who provides the required equipment.

## 2024-08-02 NOTE — H&P ADULT - PROBLEM SELECTOR PLAN 6
Primary Heparin for DVT prevention  influenza  Vaccine (HIGH DOSE) 0.7 milliLiter(s) IntraMuscular once  lactobacillus acidophilus 1 Tablet(s) Oral daily  potassium chloride    Tablet ER 10 milliEquivalent(s) Oral three times a day:  acetaminophen     Tablet .. 650 milliGRAM(s) Oral every 6 hours PRN Temp greater or equal to 38C (100.4F), Mild Pain (1 - 3)  aluminum hydroxide/magnesium hydroxide/simethicone Suspension 30 milliLiter(s) Oral every 4 hours PRN Dyspepsia  melatonin 3 milliGRAM(s) Oral at bedtime PRN Insomnia  ondansetron Injectable 4 milliGRAM(s) IV Push every 8 hours PRN Nausea and/or Vomiting

## 2025-02-25 ENCOUNTER — EMERGENCY (EMERGENCY)
Facility: HOSPITAL | Age: 89
LOS: 1 days | Discharge: HOME | End: 2025-02-25
Attending: EMERGENCY MEDICINE | Admitting: EMERGENCY MEDICINE
Payer: MEDICARE

## 2025-02-25 VITALS
HEART RATE: 67 BPM | DIASTOLIC BLOOD PRESSURE: 78 MMHG | SYSTOLIC BLOOD PRESSURE: 150 MMHG | TEMPERATURE: 98 F | RESPIRATION RATE: 15 BRPM | OXYGEN SATURATION: 95 %

## 2025-02-25 VITALS
SYSTOLIC BLOOD PRESSURE: 163 MMHG | HEART RATE: 64 BPM | DIASTOLIC BLOOD PRESSURE: 84 MMHG | WEIGHT: 130.07 LBS | RESPIRATION RATE: 14 BRPM | TEMPERATURE: 98 F | OXYGEN SATURATION: 94 %

## 2025-02-25 DIAGNOSIS — Z90.49 ACQUIRED ABSENCE OF OTHER SPECIFIED PARTS OF DIGESTIVE TRACT: Chronic | ICD-10-CM

## 2025-02-25 LAB
ALBUMIN SERPL ELPH-MCNC: 3.4 G/DL — SIGNIFICANT CHANGE UP (ref 3.3–5)
ALP SERPL-CCNC: 76 U/L — SIGNIFICANT CHANGE UP (ref 40–120)
ALT FLD-CCNC: 19 U/L — SIGNIFICANT CHANGE UP (ref 12–78)
ANION GAP SERPL CALC-SCNC: 9 MMOL/L — SIGNIFICANT CHANGE UP (ref 5–17)
APPEARANCE UR: ABNORMAL
AST SERPL-CCNC: 20 U/L — SIGNIFICANT CHANGE UP (ref 15–37)
BASOPHILS # BLD AUTO: 0.06 K/UL — SIGNIFICANT CHANGE UP (ref 0–0.2)
BASOPHILS NFR BLD AUTO: 0.6 % — SIGNIFICANT CHANGE UP (ref 0–2)
BILIRUB SERPL-MCNC: 0.4 MG/DL — SIGNIFICANT CHANGE UP (ref 0.2–1.2)
BILIRUB UR-MCNC: NEGATIVE — SIGNIFICANT CHANGE UP
BUN SERPL-MCNC: 16 MG/DL — SIGNIFICANT CHANGE UP (ref 7–23)
CALCIUM SERPL-MCNC: 10 MG/DL — SIGNIFICANT CHANGE UP (ref 8.5–10.1)
CHLORIDE SERPL-SCNC: 101 MMOL/L — SIGNIFICANT CHANGE UP (ref 96–108)
CO2 SERPL-SCNC: 28 MMOL/L — SIGNIFICANT CHANGE UP (ref 22–31)
COLOR SPEC: YELLOW — SIGNIFICANT CHANGE UP
CREAT SERPL-MCNC: 0.78 MG/DL — SIGNIFICANT CHANGE UP (ref 0.5–1.3)
DIFF PNL FLD: ABNORMAL
EGFR: 67 ML/MIN/1.73M2 — SIGNIFICANT CHANGE UP
EOSINOPHIL # BLD AUTO: 0.15 K/UL — SIGNIFICANT CHANGE UP (ref 0–0.5)
EOSINOPHIL NFR BLD AUTO: 1.5 % — SIGNIFICANT CHANGE UP (ref 0–6)
GLUCOSE SERPL-MCNC: 135 MG/DL — HIGH (ref 70–99)
GLUCOSE UR QL: NEGATIVE MG/DL — SIGNIFICANT CHANGE UP
HCT VFR BLD CALC: 43.8 % — SIGNIFICANT CHANGE UP (ref 34.5–45)
HGB BLD-MCNC: 14.9 G/DL — SIGNIFICANT CHANGE UP (ref 11.5–15.5)
IMM GRANULOCYTES NFR BLD AUTO: 0.5 % — SIGNIFICANT CHANGE UP (ref 0–0.9)
KETONES UR-MCNC: NEGATIVE MG/DL — SIGNIFICANT CHANGE UP
LEUKOCYTE ESTERASE UR-ACNC: ABNORMAL
LIDOCAIN IGE QN: 27 U/L — SIGNIFICANT CHANGE UP (ref 13–75)
LYMPHOCYTES # BLD AUTO: 1.7 K/UL — SIGNIFICANT CHANGE UP (ref 1–3.3)
LYMPHOCYTES # BLD AUTO: 16.7 % — SIGNIFICANT CHANGE UP (ref 13–44)
MCHC RBC-ENTMCNC: 30 PG — SIGNIFICANT CHANGE UP (ref 27–34)
MCHC RBC-ENTMCNC: 34 G/DL — SIGNIFICANT CHANGE UP (ref 32–36)
MCV RBC AUTO: 88.3 FL — SIGNIFICANT CHANGE UP (ref 80–100)
MONOCYTES # BLD AUTO: 0.94 K/UL — HIGH (ref 0–0.9)
MONOCYTES NFR BLD AUTO: 9.2 % — SIGNIFICANT CHANGE UP (ref 2–14)
NEUTROPHILS # BLD AUTO: 7.27 K/UL — SIGNIFICANT CHANGE UP (ref 1.8–7.4)
NEUTROPHILS NFR BLD AUTO: 71.5 % — SIGNIFICANT CHANGE UP (ref 43–77)
NITRITE UR-MCNC: NEGATIVE — SIGNIFICANT CHANGE UP
NRBC BLD AUTO-RTO: 0 /100 WBCS — SIGNIFICANT CHANGE UP (ref 0–0)
PH UR: 7 — SIGNIFICANT CHANGE UP (ref 5–8)
PLATELET # BLD AUTO: 222 K/UL — SIGNIFICANT CHANGE UP (ref 150–400)
POTASSIUM SERPL-MCNC: 4.2 MMOL/L — SIGNIFICANT CHANGE UP (ref 3.5–5.3)
POTASSIUM SERPL-SCNC: 4.2 MMOL/L — SIGNIFICANT CHANGE UP (ref 3.5–5.3)
PROT SERPL-MCNC: 7.4 G/DL — SIGNIFICANT CHANGE UP (ref 6–8.3)
PROT UR-MCNC: NEGATIVE MG/DL — SIGNIFICANT CHANGE UP
RBC # BLD: 4.96 M/UL — SIGNIFICANT CHANGE UP (ref 3.8–5.2)
RBC # FLD: 12.5 % — SIGNIFICANT CHANGE UP (ref 10.3–14.5)
SODIUM SERPL-SCNC: 138 MMOL/L — SIGNIFICANT CHANGE UP (ref 135–145)
SP GR SPEC: 1.01 — SIGNIFICANT CHANGE UP (ref 1–1.03)
UROBILINOGEN FLD QL: 0.2 MG/DL — SIGNIFICANT CHANGE UP (ref 0.2–1)
WBC # BLD: 10.17 K/UL — SIGNIFICANT CHANGE UP (ref 3.8–10.5)
WBC # FLD AUTO: 10.17 K/UL — SIGNIFICANT CHANGE UP (ref 3.8–10.5)

## 2025-02-25 PROCEDURE — 99284 EMERGENCY DEPT VISIT MOD MDM: CPT | Mod: 25

## 2025-02-25 PROCEDURE — 81001 URINALYSIS AUTO W/SCOPE: CPT

## 2025-02-25 PROCEDURE — 80053 COMPREHEN METABOLIC PANEL: CPT

## 2025-02-25 PROCEDURE — 36415 COLL VENOUS BLD VENIPUNCTURE: CPT

## 2025-02-25 PROCEDURE — 85025 COMPLETE CBC W/AUTO DIFF WBC: CPT

## 2025-02-25 PROCEDURE — 87077 CULTURE AEROBIC IDENTIFY: CPT

## 2025-02-25 PROCEDURE — 74176 CT ABD & PELVIS W/O CONTRAST: CPT | Mod: MC

## 2025-02-25 PROCEDURE — 83690 ASSAY OF LIPASE: CPT

## 2025-02-25 PROCEDURE — 74176 CT ABD & PELVIS W/O CONTRAST: CPT | Mod: 26

## 2025-02-25 PROCEDURE — 87086 URINE CULTURE/COLONY COUNT: CPT

## 2025-02-25 PROCEDURE — 87186 SC STD MICRODIL/AGAR DIL: CPT

## 2025-02-25 PROCEDURE — 99284 EMERGENCY DEPT VISIT MOD MDM: CPT | Mod: FS

## 2025-02-25 RX ORDER — CEFUROXIME AXETIL 500 MG
500 TABLET ORAL ONCE
Refills: 0 | Status: COMPLETED | OUTPATIENT
Start: 2025-02-25 | End: 2025-02-25

## 2025-02-25 RX ORDER — BACTERIOSTATIC SODIUM CHLORIDE 0.9 %
1000 VIAL (ML) INJECTION ONCE
Refills: 0 | Status: COMPLETED | OUTPATIENT
Start: 2025-02-25 | End: 2025-02-25

## 2025-02-25 RX ORDER — CEFUROXIME AXETIL 500 MG
1 TABLET ORAL
Qty: 14 | Refills: 0
Start: 2025-02-25 | End: 2025-03-03

## 2025-02-25 RX ADMIN — Medication 1000 MILLILITER(S): at 19:35

## 2025-02-25 RX ADMIN — Medication 500 MILLIGRAM(S): at 21:25

## 2025-02-25 NOTE — ED ADULT NURSE NOTE - OBJECTIVE STATEMENT
pt is AOX3, family at bedside. pt brought in the ED for c/o jones not draining and sediment noted in urine bag. Pt denies pain. Denies blood in urine. Denies pelvic pain or pressure. pt does see out pt urologist. no distress noted. Jones changed as per MD orders. 16FR. care ongoing. call bell placed within reach.

## 2025-02-25 NOTE — ED PROVIDER NOTE - NSICDXPASTMEDICALHX_GEN_ALL_CORE_FT
PAST MEDICAL HISTORY:  Colon cancer s/p resection 04    [place] (hard of hearing) right ear hearing aid    Hypertension     Pacemaker     Vertigo

## 2025-02-25 NOTE — ED PROVIDER NOTE - NSFOLLOWUPINSTRUCTIONS_ED_ALL_ED_FT
Follow up with Urology. Return for fever, vomiting, bloody urine, abdominal pain, worsening condition. An antibiotic was sent to your pharmacy.      Catheter-associated Urinary Tract Infection    WHAT YOU NEED TO KNOW:    A CAUTI is an infection caused by an indwelling urinary catheter. The infection is caused by germs that do not usually live in the urinary tract. The germ can be a fungus or bacteria. Germs may get into the urinary tract when the catheter is being put in or while the catheter stays in the bladder. The infection may travel along the catheter and into the bladder or kidneys.    DISCHARGE INSTRUCTIONS:    Return to the emergency department if:    You have severe pain in your lower back or abdomen.    You have blood in your urine.    You stop urinating, or you urinate much less than usual.  Call your doctor if:    You have a fever.    Your symptoms do not improve or get worse.    You have questions or concerns about your condition or care.  Medicines: You may need any of the following:    Antibiotics help treat an infection caused by bacteria.    Antifungals help treat an infection caused by fungus.    Acetaminophen decreases pain and fever. It is available without a doctor's order. Ask how much to take and how often to take it. Follow directions. Read the labels of all other medicines you are using to see if they also contain acetaminophen, or ask your doctor or pharmacist. Acetaminophen can cause liver damage if not taken correctly.    NSAIDs, such as ibuprofen, help decrease swelling, pain, and fever. This medicine is available with or without a doctor's order. NSAIDs can cause stomach bleeding or kidney problems in certain people. If you take blood thinner medicine, always ask your healthcare provider if NSAIDs are safe for you. Always read the medicine label and follow directions.    Take your medicine as directed. Contact your healthcare provider if you think your medicine is not helping or if you have side effects. Tell your provider if you are allergic to any medicine. Keep a list of the medicines, vitamins, and herbs you take. Include the amounts, and when and why you take them. Bring the list or the pill bottles to follow-up visits. Carry your medicine list with you in case of an emergency.  Self-care:    Drink liquids as directed. Liquids can help flush bacteria from your urinary tract. Ask how much liquid to drink each day and which liquids are best for you. You may need to drink more liquids than usual to help flush out the bacteria. Do not drink alcohol, caffeine, and citrus juices. These can irritate your bladder and increase your symptoms.    Keep the catheter area clean. Clean your skin around the catheter as directed. Shower once a day. Do not take baths or go in hot tubs until your infection is gone.    Do not have sex until your healthcare provider says it is okay. Sex may delay healing or cause another UTI.  Prevent another CAUTI:    Wash your hands before and after you use the bathroom or touch the catheter. Wash your hands to prevent the spread of infection to your urinary tract.  Handwashing      Clean all parts of your catheter as directed. Keep your catheter tubing clean. Do not place the catheter on the ground. Do not allow the drainage spout to touch the toilet. Use an alcohol swab to clean the end of drainage spout as directed.    Keep the drainage bag below your waist. This may prevent urine from moving back into your bladder, which can cause an infection.    Empty the urine bag as directed. This may prevent urine from flowing back into your bladder.    Women should wipe front to back after a bowel movement. This may prevent germs from getting into the urinary tract.    Keep the catheter secured to your leg as directed. Use tape or a special catheter candelario to prevent your catheter from being pulled. This may also prevent kinks that could cause the urine to move back into the bladder.  Follow up with your doctor as directed: Write down your questions so you remember to ask them during your visits.

## 2025-02-25 NOTE — ED PROVIDER NOTE - PHYSICAL EXAMINATION

## 2025-02-25 NOTE — ED PROVIDER NOTE - CLINICAL SUMMARY MEDICAL DECISION MAKING FREE TEXT BOX
89 female presents to the emergency department, has dementia, states that she feels well, niece at the bedside states that her aide called for ambulance because patient had been complaining of abdominal pain and it seemed like the Yoo catheter was not working, patient is due to have Yoo catheter removed this week, patient appears comfortable now, will follow-up CT renal, CBC, CMP, urinalysis, IV fluids and reevaluate.

## 2025-02-25 NOTE — ED ADULT NURSE NOTE - CHPI ED NUR SYMPTOMS POS
jones change

## 2025-02-25 NOTE — ED ADULT TRIAGE NOTE - CHIEF COMPLAINT QUOTE
Pt brought in by EMS from home, home health aid called for possible problems with catheter - pt having urgency with urinating but unsure if urine is coming out. There is clear yellow urine present in jones bag.

## 2025-02-25 NOTE — ED PROVIDER NOTE - PATIENT PORTAL LINK FT
You can access the FollowMyHealth Patient Portal offered by [facility] by registering at the following website: [email or web address]. By joining [employer]’s FollowMyHealth portal, you will also be able to view your health information using other applications (apps) compatible with our system.

## 2025-02-25 NOTE — ED PROVIDER NOTE - OBJECTIVE STATEMENT
89 y/o female with PMHx Dementia presents today to the ER today due to abdominal pain. Pts niece at the bedside reports patient was complaining of abdominal pain earlier while going to the bathroom due to having to urinate. Triage noting that patients catheter wasn't draining. pt reports she is feeling well currently without complaints. pt denies abdominal pain currently, fever, vomiting, chest pain, hematuria, or any other complaints.

## 2025-02-25 NOTE — ED PROVIDER NOTE - CARE PROVIDER_API CALL
Abdulaziz Paul  Urology  [address], [address]  [place] [address]  Phone: [phone]  Fax: [phone]  Follow Up Time:

## 2025-02-25 NOTE — ED PROVIDER NOTE - PROGRESS NOTE DETAILS
pt had CT prior to jones change. Jones changed with good output. Bladder scan performed. All questions were answered. Discussed the importance of prompt, close medical follow-up. Patient will return with any changes, concerns or persistent/worsening symptoms.  Patient verbalized understanding.

## 2025-03-12 NOTE — PROGRESS NOTE ADULT - PROBLEM SELECTOR PLAN 2
10:30
Urinary retention causing b/l hydro  Yoo placed  Started flomax   consult noted  Further work-up/management pending clinical course.
Urinary retention causing b/l hydro  Yoo placed  Started flomax   consult noted  Further work-up/management pending clinical course.

## 2025-07-15 ENCOUNTER — EMERGENCY (EMERGENCY)
Facility: HOSPITAL | Age: 89
LOS: 1 days | End: 2025-07-15
Attending: INTERNAL MEDICINE | Admitting: INTERNAL MEDICINE
Payer: MEDICARE

## 2025-07-15 VITALS
TEMPERATURE: 99 F | HEART RATE: 89 BPM | OXYGEN SATURATION: 97 % | DIASTOLIC BLOOD PRESSURE: 79 MMHG | SYSTOLIC BLOOD PRESSURE: 142 MMHG | RESPIRATION RATE: 18 BRPM

## 2025-07-15 VITALS
RESPIRATION RATE: 16 BRPM | WEIGHT: 145.06 LBS | OXYGEN SATURATION: 97 % | SYSTOLIC BLOOD PRESSURE: 157 MMHG | TEMPERATURE: 100 F | HEART RATE: 96 BPM | DIASTOLIC BLOOD PRESSURE: 100 MMHG

## 2025-07-15 DIAGNOSIS — Z90.49 ACQUIRED ABSENCE OF OTHER SPECIFIED PARTS OF DIGESTIVE TRACT: Chronic | ICD-10-CM

## 2025-07-15 PROCEDURE — 99283 EMERGENCY DEPT VISIT LOW MDM: CPT

## 2025-07-15 PROCEDURE — 99283 EMERGENCY DEPT VISIT LOW MDM: CPT | Mod: 25

## 2025-07-15 PROCEDURE — 51702 INSERT TEMP BLADDER CATH: CPT

## 2025-07-15 NOTE — ED PROVIDER NOTE - CLINICAL SUMMARY MEDICAL DECISION MAKING FREE TEXT BOX
89 y/o female H/O chronic Yoo cathter , C/C symptoms of urinary retention, bladder pressure. no headache, no fever, no rash no toxemia, no chest pain, no SOB, no palpitations, no abdominal pain,  no n/v no neuro changes. VSS exam bladder pressure, scan >350 cc  relieved with new Yoo placement. DC to OP care

## 2025-07-15 NOTE — ED PROVIDER NOTE - CARE PROVIDER_API CALL
Maynor Foote)  Urology  [address], [address]  [place] [address]  Phone: [phone]  Fax: [phone]  Follow Up Time: 1-3 Days

## 2025-07-15 NOTE — ED ADULT NURSE REASSESSMENT NOTE - NS ED NURSE REASSESS COMMENT FT1
2000:  leg bag placed, vitals previously recorded.  the patient is to f/u with [facility].  she will be wheeled out to discharge.  any new symptoms occur or worsening symptoms, she will return to the er.  jodi edward.

## 2025-07-15 NOTE — ED ADULT NURSE NOTE - NSICDXPASTMEDICALHX_GEN_ALL_CORE_FT
PAST MEDICAL HISTORY:  Colon cancer s/p resection 04    [place] (hard of hearing) right ear hearing aid    Hypertension     Pacemaker     Vertigo

## 2025-07-15 NOTE — ED ADULT NURSE REASSESSMENT NOTE - NS ED NURSE REASSESS COMMENT FT1
1915:  bladder scan performed.  348 ml's   old 16fr jones removed, new 16fr inserted under sterile technique, tolerated well.  350ml out.  patient cleaned  jodi edward.

## 2025-07-15 NOTE — ED PROVIDER NOTE - LOCATION
Looks like pt has a current pregnancy.     Patient Contact    Attempt #     Was call answered?  No.  Left message on voicemail with information to call me back.     urinary

## 2025-07-15 NOTE — ED PROVIDER NOTE - PATIENT PORTAL LINK FT
You can access the FollowMyHealth Patient Portal offered by [facility] by registering at the following website: [email or web address]. By joining [employer]’s FollowMyHealth portal, you will also be able to view your health information using other applications (apps) compatible with our system.

## 2025-07-15 NOTE — ED ADULT NURSE NOTE - NSFALLHARMRISKINTERV_ED_ALL_ED

## 2025-07-15 NOTE — ED PROVIDER NOTE - SIGNIFICANT NEGATIVE FINDINGS
no headache, no fever, no rash no toxemia, no chest pain, no SOB, no palpitations, no abdominal pain,  no n/v no neuro changes.

## 2025-07-15 NOTE — ED PROVIDER NOTE - NSICDXPASTMEDICALHX_GEN_ALL_CORE_FT
PAST MEDICAL HISTORY:  Colon cancer s/p resection 04    [place] (hard of hearing) right ear hearing aid    Hypertension     Pacemaker     Vertigo

## 2025-07-15 NOTE — ED PROVIDER NOTE - OBJECTIVE STATEMENT
89 y/o female H/O chronic Yoo cathter , C/C symptoms of urinary retention, bladder pressure. no headache, no fever, no rash no toxemia, no chest pain, no SOB, no palpitations, no abdominal pain,  no n/v no neuro changes.

## 2025-07-15 NOTE — ED ADULT NURSE REASSESSMENT NOTE - NS ED NURSE REASSESS COMMENT FT1
1935:  md aware of new jones insertion.  the patient is much improved.  awaiting discharge papers.  jodi edward.

## 2025-07-15 NOTE — ED PROVIDER NOTE - NSFOLLOWUPINSTRUCTIONS_ED_ALL_ED_FT
follow up with your primary care doctor   and urology referral  Return to the hospital for increased symptoms or any changes in your condition.

## 2025-07-15 NOTE — ED ADULT NURSE NOTE - OBJECTIVE STATEMENT
the patient is alert / oriented x4, states she has had a Jones for a very long time (my bladder doesn't work anymore)  this most recent jones was just placed last week, but apparently, not working.  MD attempted to flush the Jones but unsuccessful in doing so.  I attempted to flush, unable.    new 16fr jones placed under sterile technique, no distress, urine flowing freely with much improvement in symptoms.